# Patient Record
Sex: FEMALE | Race: WHITE | NOT HISPANIC OR LATINO | Employment: OTHER | URBAN - NONMETROPOLITAN AREA
[De-identification: names, ages, dates, MRNs, and addresses within clinical notes are randomized per-mention and may not be internally consistent; named-entity substitution may affect disease eponyms.]

---

## 2017-10-17 ENCOUNTER — TRANSCRIBE ORDERS (OUTPATIENT)
Dept: ADMINISTRATIVE | Facility: HOSPITAL | Age: 77
End: 2017-10-17

## 2017-10-17 DIAGNOSIS — R41.0 CONFUSION: Primary | ICD-10-CM

## 2017-10-20 ENCOUNTER — HOSPITAL ENCOUNTER (OUTPATIENT)
Dept: MRI IMAGING | Facility: HOSPITAL | Age: 77
Discharge: HOME | End: 2017-10-20
Attending: INTERNAL MEDICINE | Admitting: INTERNAL MEDICINE

## 2017-10-20 DIAGNOSIS — R41.0 CONFUSION: ICD-10-CM

## 2017-10-20 PROCEDURE — 70551 MRI BRAIN STEM W/O DYE: CPT

## 2017-10-20 PROCEDURE — 70551 MRI BRAIN STEM W/O DYE: CPT | Performed by: RADIOLOGY

## 2020-01-31 ENCOUNTER — TRANSCRIBE ORDERS (OUTPATIENT)
Dept: ADMINISTRATIVE | Facility: HOSPITAL | Age: 80
End: 2020-01-31

## 2020-01-31 DIAGNOSIS — H53.9 VISION DISORDER: ICD-10-CM

## 2020-01-31 DIAGNOSIS — Z86.73 PERSONAL HISTORY OF TRANSIENT CEREBRAL ISCHEMIA: Primary | ICD-10-CM

## 2020-01-31 DIAGNOSIS — R55 SYNCOPE AND COLLAPSE: ICD-10-CM

## 2020-01-31 DIAGNOSIS — R20.2 PARESTHESIA: ICD-10-CM

## 2020-01-31 DIAGNOSIS — Z85.841 PERSONAL HISTORY OF BRAIN CANCER: ICD-10-CM

## 2020-01-31 DIAGNOSIS — R55 VASOVAGAL SYNCOPE: ICD-10-CM

## 2020-01-31 DIAGNOSIS — Z85.841 PERSONAL HISTORY OF MALIGNANT NEOPLASM OF BRAIN: ICD-10-CM

## 2020-01-31 DIAGNOSIS — H53.9 UNSPECIFIED VISUAL DISTURBANCE: ICD-10-CM

## 2020-01-31 DIAGNOSIS — Z86.73 PERSONAL HISTORY OF TIA (TRANSIENT ISCHEMIC ATTACK): Primary | ICD-10-CM

## 2020-02-03 ENCOUNTER — HOSPITAL ENCOUNTER (OUTPATIENT)
Dept: CT IMAGING | Facility: HOSPITAL | Age: 80
Discharge: HOME | End: 2020-02-03
Admitting: NURSE PRACTITIONER

## 2020-02-03 ENCOUNTER — APPOINTMENT (OUTPATIENT)
Dept: CARDIOLOGY | Facility: HOSPITAL | Age: 80
End: 2020-02-03

## 2020-02-03 ENCOUNTER — APPOINTMENT (OUTPATIENT)
Dept: CT IMAGING | Facility: HOSPITAL | Age: 80
End: 2020-02-03

## 2020-02-03 DIAGNOSIS — H53.9 VISION DISORDER: ICD-10-CM

## 2020-02-03 DIAGNOSIS — R20.2 PARESTHESIA: ICD-10-CM

## 2020-02-03 DIAGNOSIS — Z86.73 PERSONAL HISTORY OF TIA (TRANSIENT ISCHEMIC ATTACK): ICD-10-CM

## 2020-02-03 DIAGNOSIS — R55 VASOVAGAL SYNCOPE: ICD-10-CM

## 2020-02-03 DIAGNOSIS — Z85.841 PERSONAL HISTORY OF BRAIN CANCER: ICD-10-CM

## 2020-02-03 PROCEDURE — 70450 CT HEAD/BRAIN W/O DYE: CPT

## 2020-02-03 PROCEDURE — 70450 CT HEAD/BRAIN W/O DYE: CPT | Performed by: RADIOLOGY

## 2020-07-12 ENCOUNTER — HOSPITAL ENCOUNTER (OUTPATIENT)
Facility: HOSPITAL | Age: 80
Setting detail: OBSERVATION
Discharge: HOME | End: 2020-07-14
Attending: INTERNAL MEDICINE | Admitting: INTERNAL MEDICINE

## 2020-07-12 DIAGNOSIS — R41.841 COGNITIVE COMMUNICATION DEFICIT: ICD-10-CM

## 2020-07-12 DIAGNOSIS — G45.9 TIA (TRANSIENT ISCHEMIC ATTACK): Primary | ICD-10-CM

## 2020-07-12 DIAGNOSIS — Z00.6 EXAMINATION FOR NORMAL COMPARISON FOR CLINICAL RESEARCH: ICD-10-CM

## 2020-07-12 PROBLEM — Z85.841 HISTORY OF BRAIN CANCER: Status: ACTIVE | Noted: 2020-07-12

## 2020-07-12 PROBLEM — Z86.73 HISTORY OF CVA (CEREBROVASCULAR ACCIDENT): Status: ACTIVE | Noted: 2020-07-12

## 2020-07-12 PROCEDURE — 82962 GLUCOSE BLOOD TEST: CPT

## 2020-07-12 PROCEDURE — 99203 OFFICE O/P NEW LOW 30 MIN: CPT | Performed by: NURSE PRACTITIONER

## 2020-07-12 PROCEDURE — G0378 HOSPITAL OBSERVATION PER HR: HCPCS

## 2020-07-12 PROCEDURE — 99220 PR INITIAL OBSERVATION CARE/DAY 70 MINUTES: CPT | Performed by: INTERNAL MEDICINE

## 2020-07-12 RX ORDER — SODIUM CHLORIDE 0.9 % (FLUSH) 0.9 %
10 SYRINGE (ML) INJECTION AS NEEDED
Status: DISCONTINUED | OUTPATIENT
Start: 2020-07-12 | End: 2020-07-14 | Stop reason: HOSPADM

## 2020-07-12 RX ORDER — ACETAMINOPHEN 650 MG/1
650 SUPPOSITORY RECTAL EVERY 4 HOURS PRN
Status: DISCONTINUED | OUTPATIENT
Start: 2020-07-12 | End: 2020-07-14 | Stop reason: HOSPADM

## 2020-07-12 RX ORDER — MONTELUKAST SODIUM 10 MG/1
10 TABLET ORAL NIGHTLY
Status: DISCONTINUED | OUTPATIENT
Start: 2020-07-13 | End: 2020-07-14 | Stop reason: HOSPADM

## 2020-07-12 RX ORDER — PANTOPRAZOLE SODIUM 40 MG/1
40 TABLET, DELAYED RELEASE ORAL
Status: DISCONTINUED | OUTPATIENT
Start: 2020-07-13 | End: 2020-07-14 | Stop reason: HOSPADM

## 2020-07-12 RX ORDER — ASPIRIN 81 MG/1
81 TABLET, CHEWABLE ORAL DAILY
Status: DISCONTINUED | OUTPATIENT
Start: 2020-07-13 | End: 2020-07-14 | Stop reason: HOSPADM

## 2020-07-12 RX ORDER — ACETAMINOPHEN 160 MG/5ML
650 SOLUTION ORAL EVERY 4 HOURS PRN
Status: DISCONTINUED | OUTPATIENT
Start: 2020-07-12 | End: 2020-07-14 | Stop reason: HOSPADM

## 2020-07-12 RX ORDER — ASPIRIN 300 MG/1
300 SUPPOSITORY RECTAL DAILY
Status: DISCONTINUED | OUTPATIENT
Start: 2020-07-13 | End: 2020-07-14 | Stop reason: HOSPADM

## 2020-07-12 RX ORDER — SODIUM CHLORIDE 0.9 % (FLUSH) 0.9 %
10 SYRINGE (ML) INJECTION EVERY 12 HOURS SCHEDULED
Status: DISCONTINUED | OUTPATIENT
Start: 2020-07-12 | End: 2020-07-14 | Stop reason: HOSPADM

## 2020-07-12 RX ORDER — CETIRIZINE HYDROCHLORIDE 10 MG/1
5 TABLET ORAL DAILY
Status: DISCONTINUED | OUTPATIENT
Start: 2020-07-13 | End: 2020-07-14 | Stop reason: HOSPADM

## 2020-07-12 RX ORDER — ACETAMINOPHEN 325 MG/1
650 TABLET ORAL EVERY 4 HOURS PRN
Status: DISCONTINUED | OUTPATIENT
Start: 2020-07-12 | End: 2020-07-14 | Stop reason: HOSPADM

## 2020-07-12 RX ORDER — DEXTROSE MONOHYDRATE 25 G/50ML
25 INJECTION, SOLUTION INTRAVENOUS
Status: DISCONTINUED | OUTPATIENT
Start: 2020-07-12 | End: 2020-07-14 | Stop reason: HOSPADM

## 2020-07-12 RX ORDER — DONEPEZIL HYDROCHLORIDE 10 MG/1
10 TABLET, FILM COATED ORAL NIGHTLY
Status: DISCONTINUED | OUTPATIENT
Start: 2020-07-13 | End: 2020-07-14 | Stop reason: HOSPADM

## 2020-07-12 RX ORDER — CLOPIDOGREL BISULFATE 75 MG/1
75 TABLET ORAL DAILY
Status: DISCONTINUED | OUTPATIENT
Start: 2020-07-13 | End: 2020-07-14 | Stop reason: HOSPADM

## 2020-07-12 RX ORDER — ATORVASTATIN CALCIUM 40 MG/1
80 TABLET, FILM COATED ORAL NIGHTLY
Status: DISCONTINUED | OUTPATIENT
Start: 2020-07-12 | End: 2020-07-13

## 2020-07-12 RX ORDER — GABAPENTIN 300 MG/1
600 CAPSULE ORAL EVERY 12 HOURS SCHEDULED
Status: DISCONTINUED | OUTPATIENT
Start: 2020-07-13 | End: 2020-07-14 | Stop reason: HOSPADM

## 2020-07-12 RX ORDER — NICOTINE POLACRILEX 4 MG
15 LOZENGE BUCCAL
Status: DISCONTINUED | OUTPATIENT
Start: 2020-07-12 | End: 2020-07-14 | Stop reason: HOSPADM

## 2020-07-12 RX ORDER — SODIUM CHLORIDE 0.9 % (FLUSH) 0.9 %
10 SYRINGE (ML) INJECTION EVERY 12 HOURS SCHEDULED
Status: DISCONTINUED | OUTPATIENT
Start: 2020-07-13 | End: 2020-07-14 | Stop reason: HOSPADM

## 2020-07-13 ENCOUNTER — APPOINTMENT (OUTPATIENT)
Dept: MRI IMAGING | Facility: HOSPITAL | Age: 80
End: 2020-07-13

## 2020-07-13 ENCOUNTER — APPOINTMENT (OUTPATIENT)
Dept: CARDIOLOGY | Facility: HOSPITAL | Age: 80
End: 2020-07-13

## 2020-07-13 ENCOUNTER — APPOINTMENT (OUTPATIENT)
Dept: OTHER | Facility: HOSPITAL | Age: 80
End: 2020-07-13

## 2020-07-13 ENCOUNTER — APPOINTMENT (OUTPATIENT)
Dept: CT IMAGING | Facility: HOSPITAL | Age: 80
End: 2020-07-13

## 2020-07-13 PROBLEM — I20.9 ANGINA PECTORIS (HCC): Status: ACTIVE | Noted: 2020-07-13

## 2020-07-13 PROBLEM — E11.9 TYPE 2 DIABETES MELLITUS: Status: ACTIVE | Noted: 2020-07-13

## 2020-07-13 PROBLEM — R55 SYNCOPE: Status: ACTIVE | Noted: 2020-07-13

## 2020-07-13 LAB
ANION GAP SERPL CALCULATED.3IONS-SCNC: 15 MMOL/L (ref 5–15)
BASOPHILS # BLD AUTO: 0.06 10*3/MM3 (ref 0–0.2)
BASOPHILS NFR BLD AUTO: 0.8 % (ref 0–1.5)
BILIRUB UR QL STRIP: NEGATIVE
BUN SERPL-MCNC: 17 MG/DL (ref 8–23)
BUN/CREAT SERPL: 22.1 (ref 7–25)
CALCIUM SPEC-SCNC: 9.1 MG/DL (ref 8.6–10.5)
CHLORIDE SERPL-SCNC: 104 MMOL/L (ref 98–107)
CHOLEST SERPL-MCNC: 93 MG/DL (ref 0–200)
CLARITY UR: CLEAR
CO2 SERPL-SCNC: 24 MMOL/L (ref 22–29)
COLOR UR: YELLOW
CREAT SERPL-MCNC: 0.77 MG/DL (ref 0.57–1)
DEPRECATED RDW RBC AUTO: 45.8 FL (ref 37–54)
EOSINOPHIL # BLD AUTO: 0.5 10*3/MM3 (ref 0–0.4)
EOSINOPHIL NFR BLD AUTO: 6.7 % (ref 0.3–6.2)
ERYTHROCYTE [DISTWIDTH] IN BLOOD BY AUTOMATED COUNT: 13 % (ref 12.3–15.4)
GFR SERPL CREATININE-BSD FRML MDRD: 72 ML/MIN/1.73
GLUCOSE BLDC GLUCOMTR-MCNC: 112 MG/DL (ref 70–130)
GLUCOSE BLDC GLUCOMTR-MCNC: 136 MG/DL (ref 70–130)
GLUCOSE BLDC GLUCOMTR-MCNC: 168 MG/DL (ref 70–130)
GLUCOSE BLDC GLUCOMTR-MCNC: 175 MG/DL (ref 70–130)
GLUCOSE BLDC GLUCOMTR-MCNC: 179 MG/DL (ref 70–130)
GLUCOSE SERPL-MCNC: 183 MG/DL (ref 65–99)
GLUCOSE UR STRIP-MCNC: ABNORMAL MG/DL
HBA1C MFR BLD: 7.2 % (ref 4.8–5.6)
HCT VFR BLD AUTO: 37.9 % (ref 34–46.6)
HDLC SERPL-MCNC: 50 MG/DL (ref 40–60)
HGB BLD-MCNC: 12.3 G/DL (ref 12–15.9)
HGB UR QL STRIP.AUTO: NEGATIVE
HOLD SPECIMEN: NORMAL
HOLD SPECIMEN: NORMAL
IMM GRANULOCYTES # BLD AUTO: 0.03 10*3/MM3 (ref 0–0.05)
IMM GRANULOCYTES NFR BLD AUTO: 0.4 % (ref 0–0.5)
KETONES UR QL STRIP: NEGATIVE
LDLC SERPL CALC-MCNC: 12 MG/DL (ref 0–100)
LDLC/HDLC SERPL: 0.25 {RATIO}
LEUKOCYTE ESTERASE UR QL STRIP.AUTO: NEGATIVE
LYMPHOCYTES # BLD AUTO: 2.22 10*3/MM3 (ref 0.7–3.1)
LYMPHOCYTES NFR BLD AUTO: 29.7 % (ref 19.6–45.3)
MCH RBC QN AUTO: 30.8 PG (ref 26.6–33)
MCHC RBC AUTO-ENTMCNC: 32.5 G/DL (ref 31.5–35.7)
MCV RBC AUTO: 95 FL (ref 79–97)
MONOCYTES # BLD AUTO: 0.59 10*3/MM3 (ref 0.1–0.9)
MONOCYTES NFR BLD AUTO: 7.9 % (ref 5–12)
NEUTROPHILS NFR BLD AUTO: 4.08 10*3/MM3 (ref 1.7–7)
NEUTROPHILS NFR BLD AUTO: 54.5 % (ref 42.7–76)
NITRITE UR QL STRIP: NEGATIVE
NRBC BLD AUTO-RTO: 0 /100 WBC (ref 0–0.2)
PH UR STRIP.AUTO: 7 [PH] (ref 5–8)
PLATELET # BLD AUTO: 196 10*3/MM3 (ref 140–450)
PMV BLD AUTO: 11.1 FL (ref 6–12)
POTASSIUM SERPL-SCNC: 3.5 MMOL/L (ref 3.5–5.2)
PROT UR QL STRIP: NEGATIVE
RBC # BLD AUTO: 3.99 10*6/MM3 (ref 3.77–5.28)
SODIUM SERPL-SCNC: 143 MMOL/L (ref 136–145)
SP GR UR STRIP: 1.04 (ref 1–1.03)
TRIGL SERPL-MCNC: 153 MG/DL (ref 0–150)
TROPONIN T SERPL-MCNC: <0.01 NG/ML (ref 0–0.03)
UROBILINOGEN UR QL STRIP: ABNORMAL
VLDLC SERPL-MCNC: 30.6 MG/DL
WBC # BLD AUTO: 7.48 10*3/MM3 (ref 3.4–10.8)
WHOLE BLOOD HOLD SPECIMEN: NORMAL
WHOLE BLOOD HOLD SPECIMEN: NORMAL

## 2020-07-13 PROCEDURE — G0378 HOSPITAL OBSERVATION PER HR: HCPCS

## 2020-07-13 PROCEDURE — 0042T HC CT CEREBRAL PERFUSION W/WO CONTRAST: CPT

## 2020-07-13 PROCEDURE — 93306 TTE W/DOPPLER COMPLETE: CPT | Performed by: INTERNAL MEDICINE

## 2020-07-13 PROCEDURE — A9577 INJ MULTIHANCE: HCPCS | Performed by: INTERNAL MEDICINE

## 2020-07-13 PROCEDURE — 97530 THERAPEUTIC ACTIVITIES: CPT

## 2020-07-13 PROCEDURE — 0 IOPAMIDOL PER 1 ML: Performed by: INTERNAL MEDICINE

## 2020-07-13 PROCEDURE — C9803 HOPD COVID-19 SPEC COLLECT: HCPCS | Performed by: INTERNAL MEDICINE

## 2020-07-13 PROCEDURE — 93005 ELECTROCARDIOGRAM TRACING: CPT | Performed by: PHYSICIAN ASSISTANT

## 2020-07-13 PROCEDURE — 97166 OT EVAL MOD COMPLEX 45 MIN: CPT

## 2020-07-13 PROCEDURE — U0002 COVID-19 LAB TEST NON-CDC: HCPCS | Performed by: INTERNAL MEDICINE

## 2020-07-13 PROCEDURE — 83036 HEMOGLOBIN GLYCOSYLATED A1C: CPT | Performed by: NURSE PRACTITIONER

## 2020-07-13 PROCEDURE — 85025 COMPLETE CBC W/AUTO DIFF WBC: CPT | Performed by: NURSE PRACTITIONER

## 2020-07-13 PROCEDURE — 25010000002 HEPARIN (PORCINE) PER 1000 UNITS: Performed by: INTERNAL MEDICINE

## 2020-07-13 PROCEDURE — 84484 ASSAY OF TROPONIN QUANT: CPT | Performed by: INTERNAL MEDICINE

## 2020-07-13 PROCEDURE — 82962 GLUCOSE BLOOD TEST: CPT

## 2020-07-13 PROCEDURE — 0 GADOBENATE DIMEGLUMINE 529 MG/ML SOLUTION: Performed by: INTERNAL MEDICINE

## 2020-07-13 PROCEDURE — G0108 DIAB MANAGE TRN  PER INDIV: HCPCS

## 2020-07-13 PROCEDURE — U0004 COV-19 TEST NON-CDC HGH THRU: HCPCS | Performed by: INTERNAL MEDICINE

## 2020-07-13 PROCEDURE — 97162 PT EVAL MOD COMPLEX 30 MIN: CPT

## 2020-07-13 PROCEDURE — 70553 MRI BRAIN STEM W/O & W/DYE: CPT

## 2020-07-13 PROCEDURE — 99213 OFFICE O/P EST LOW 20 MIN: CPT | Performed by: PSYCHIATRY & NEUROLOGY

## 2020-07-13 PROCEDURE — 93306 TTE W/DOPPLER COMPLETE: CPT

## 2020-07-13 PROCEDURE — 93010 ELECTROCARDIOGRAM REPORT: CPT | Performed by: INTERNAL MEDICINE

## 2020-07-13 PROCEDURE — 96372 THER/PROPH/DIAG INJ SC/IM: CPT

## 2020-07-13 PROCEDURE — 70498 CT ANGIOGRAPHY NECK: CPT

## 2020-07-13 PROCEDURE — 81003 URINALYSIS AUTO W/O SCOPE: CPT | Performed by: INTERNAL MEDICINE

## 2020-07-13 PROCEDURE — 80048 BASIC METABOLIC PNL TOTAL CA: CPT | Performed by: NURSE PRACTITIONER

## 2020-07-13 PROCEDURE — 70496 CT ANGIOGRAPHY HEAD: CPT

## 2020-07-13 PROCEDURE — 80061 LIPID PANEL: CPT | Performed by: NURSE PRACTITIONER

## 2020-07-13 PROCEDURE — 99225 PR SBSQ OBSERVATION CARE/DAY 25 MINUTES: CPT | Performed by: INTERNAL MEDICINE

## 2020-07-13 PROCEDURE — 63710000001 INSULIN LISPRO (HUMAN) PER 5 UNITS: Performed by: NURSE PRACTITIONER

## 2020-07-13 RX ORDER — ASPIRIN 325 MG
1 TABLET ORAL DAILY
COMMUNITY
Start: 2012-08-16 | End: 2020-07-14 | Stop reason: HOSPADM

## 2020-07-13 RX ORDER — ROSUVASTATIN CALCIUM 20 MG/1
20 TABLET, COATED ORAL NIGHTLY
Status: DISCONTINUED | OUTPATIENT
Start: 2020-07-13 | End: 2020-07-14 | Stop reason: HOSPADM

## 2020-07-13 RX ORDER — POTASSIUM CHLORIDE 1.5 G/1.77G
40 POWDER, FOR SOLUTION ORAL AS NEEDED
Status: DISCONTINUED | OUTPATIENT
Start: 2020-07-13 | End: 2020-07-14 | Stop reason: HOSPADM

## 2020-07-13 RX ORDER — DIVALPROEX SODIUM 500 MG/1
500 TABLET, DELAYED RELEASE ORAL EVERY 12 HOURS SCHEDULED
Status: DISCONTINUED | OUTPATIENT
Start: 2020-07-13 | End: 2020-07-14 | Stop reason: HOSPADM

## 2020-07-13 RX ORDER — LORAZEPAM 0.5 MG/1
0.5 TABLET ORAL EVERY 8 HOURS PRN
COMMUNITY

## 2020-07-13 RX ORDER — ENALAPRIL MALEATE 5 MG/1
10 TABLET ORAL
Status: DISCONTINUED | OUTPATIENT
Start: 2020-07-13 | End: 2020-07-14 | Stop reason: HOSPADM

## 2020-07-13 RX ORDER — CARVEDILOL 3.12 MG/1
3.12 TABLET ORAL 2 TIMES DAILY WITH MEALS
Status: DISCONTINUED | OUTPATIENT
Start: 2020-07-13 | End: 2020-07-14 | Stop reason: HOSPADM

## 2020-07-13 RX ORDER — ISOSORBIDE MONONITRATE 60 MG/1
60 TABLET, EXTENDED RELEASE ORAL DAILY
Status: DISCONTINUED | OUTPATIENT
Start: 2020-07-13 | End: 2020-07-14 | Stop reason: HOSPADM

## 2020-07-13 RX ORDER — HEPARIN SODIUM 5000 [USP'U]/ML
5000 INJECTION, SOLUTION INTRAVENOUS; SUBCUTANEOUS EVERY 8 HOURS SCHEDULED
Status: DISCONTINUED | OUTPATIENT
Start: 2020-07-13 | End: 2020-07-14 | Stop reason: HOSPADM

## 2020-07-13 RX ORDER — POTASSIUM CHLORIDE 750 MG/1
40 CAPSULE, EXTENDED RELEASE ORAL AS NEEDED
Status: DISCONTINUED | OUTPATIENT
Start: 2020-07-13 | End: 2020-07-14 | Stop reason: HOSPADM

## 2020-07-13 RX ADMIN — ENALAPRIL MALEATE 10 MG: 5 TABLET ORAL at 14:14

## 2020-07-13 RX ADMIN — POTASSIUM CHLORIDE 40 MEQ: 1.5 POWDER, FOR SOLUTION ORAL at 11:26

## 2020-07-13 RX ADMIN — SODIUM CHLORIDE, PRESERVATIVE FREE 10 ML: 5 INJECTION INTRAVENOUS at 09:35

## 2020-07-13 RX ADMIN — HEPARIN SODIUM 5000 UNITS: 5000 INJECTION INTRAVENOUS; SUBCUTANEOUS at 21:02

## 2020-07-13 RX ADMIN — DONEPEZIL HYDROCHLORIDE 10 MG: 5 TABLET ORAL at 02:42

## 2020-07-13 RX ADMIN — GABAPENTIN 600 MG: 300 CAPSULE ORAL at 02:42

## 2020-07-13 RX ADMIN — MONTELUKAST SODIUM 10 MG: 10 TABLET, COATED ORAL at 21:00

## 2020-07-13 RX ADMIN — DONEPEZIL HYDROCHLORIDE 10 MG: 5 TABLET ORAL at 20:50

## 2020-07-13 RX ADMIN — IOPAMIDOL 115 ML: 755 INJECTION, SOLUTION INTRAVENOUS at 00:10

## 2020-07-13 RX ADMIN — ACETAMINOPHEN 650 MG: 325 TABLET, FILM COATED ORAL at 02:46

## 2020-07-13 RX ADMIN — DIVALPROEX SODIUM 500 MG: 500 TABLET, DELAYED RELEASE ORAL at 17:14

## 2020-07-13 RX ADMIN — SODIUM CHLORIDE, PRESERVATIVE FREE 10 ML: 5 INJECTION INTRAVENOUS at 21:03

## 2020-07-13 RX ADMIN — GADOBENATE DIMEGLUMINE 15 ML: 529 INJECTION, SOLUTION INTRAVENOUS at 01:45

## 2020-07-13 RX ADMIN — GABAPENTIN 600 MG: 300 CAPSULE ORAL at 17:14

## 2020-07-13 RX ADMIN — INSULIN LISPRO 2 UNITS: 100 INJECTION, SOLUTION INTRAVENOUS; SUBCUTANEOUS at 12:15

## 2020-07-13 RX ADMIN — CLOPIDOGREL BISULFATE 75 MG: 75 TABLET ORAL at 09:35

## 2020-07-13 RX ADMIN — ATORVASTATIN CALCIUM 80 MG: 40 TABLET, FILM COATED ORAL at 02:41

## 2020-07-13 RX ADMIN — CETIRIZINE HYDROCHLORIDE 5 MG: 10 TABLET, FILM COATED ORAL at 09:35

## 2020-07-13 RX ADMIN — CARVEDILOL 3.12 MG: 3.12 TABLET, FILM COATED ORAL at 17:14

## 2020-07-13 RX ADMIN — DIVALPROEX SODIUM 500 MG: 500 TABLET, DELAYED RELEASE ORAL at 20:50

## 2020-07-13 RX ADMIN — ASPIRIN 81 MG: 81 TABLET, CHEWABLE ORAL at 09:35

## 2020-07-13 RX ADMIN — POTASSIUM CHLORIDE 40 MEQ: 1.5 POWDER, FOR SOLUTION ORAL at 20:49

## 2020-07-13 RX ADMIN — ROSUVASTATIN CALCIUM 20 MG: 20 TABLET, COATED ORAL at 20:50

## 2020-07-13 RX ADMIN — ISOSORBIDE MONONITRATE 60 MG: 60 TABLET, EXTENDED RELEASE ORAL at 17:14

## 2020-07-13 RX ADMIN — MONTELUKAST SODIUM 10 MG: 10 TABLET, COATED ORAL at 02:41

## 2020-07-13 RX ADMIN — SODIUM CHLORIDE, PRESERVATIVE FREE 10 ML: 5 INJECTION INTRAVENOUS at 20:50

## 2020-07-13 NOTE — THERAPY EVALUATION
"Patient Name: Samantha Napoles  : 1940    MRN: [id number]                              Today's Date: 2020       Admit Date: 2020    Visit Dx: No diagnosis found.  Patient Active Problem List   Diagnosis   • Coronary artery disease   • HTN (hypertension)   • Hypertensive heart disease   • Dyslipidemia   • Degenerative arthritis   • GERD (gastroesophageal reflux disease)   • TIA (transient ischemic attack)   • History of CVA (cerebrovascular accident)   • History of brain cancer   • Type 2 diabetes mellitus (CMS/HCC)   • Angina pectoris (CMS/HCC)   • Questionable Syncope     No past medical history on file.  Past Surgical History:   Procedure Laterality Date   • BRAIN SURGERY      \"brain tumor surgery\"; incomplete database   • CHOLECYSTECTOMY       General Information     Row Name 20 0932          PT Evaluation Time/Intention    Document Type  evaluation  (Pended)   -     Mode of Treatment  physical therapy  (Pended)   -     Row Name 2032          General Information    Patient Profile Reviewed?  yes  (Pended)   -     Prior Level of Function  independent:;gait;all household mobility;bed mobility;ADL's;community mobility;shopping;home management;cooking  (Pended)  Limited with walking distance due to fatigue  -     Existing Precautions/Restrictions  fall  (Pended)  Fall experienced in 2020  -     Barriers to Rehab  medically complex;impaired sensation  (Pended)   -     Row Name 20 0932          Relationship/Environment    Lives With  alone  (Pended)  Son lives close and checks on her daily  -     Row Name 20 0932          Resource/Environmental Concerns    Current Living Arrangements  home/apartment/condo  (Pended)   -     Row Name 20 0932          Home Main Entrance    Number of Stairs, Main Entrance  none  (Pended)   -     Row Name 20 0932          Stairs Within Home, Primary    Number of Stairs, Within Home, Primary  none  (Pended)   " -[facility] Name 07/13/20 0932          Cognitive Assessment/Intervention- PT/OT    Orientation Status (Cognition)  oriented x 3  (Pended)   -     Cognitive Assessment/Intervention Comment  Alert and follows commands  (Pended)   -[facility] Name 07/13/20 0932          Safety Issues, Functional Mobility    Safety Issues Affecting Function (Mobility)  insight into deficits/self awareness;sequencing abilities  (Pended)   -     Impairments Affecting Function (Mobility)  balance;endurance/activity tolerance;range of motion (ROM);pain;sensation/sensory awareness;strength;postural/trunk control  (Pended)   -     Comment, Safety Issues/Impairments (Mobility)  Pt has strong desire to be independent. Impulsive behaviors and jerky whole-body movements noted throughout session.  (Pended)   -       User Key  (r) = Recorded By, (t) = Taken By, (c) = Cosigned By    Initials Name Provider Type    YNES Sherri Berry, PT Student PT Student        Mobility     [facility] Name 07/13/20 1018          Bed Mobility Assessment/Treatment    Bed Mobility Assessment/Treatment  rolling left;supine-sit  (Pended)   -     Rolling Left [place] (Bed Mobility)  minimum assist (75% patient effort)  (Pended)   -     Supine-Sit [place] (Bed Mobility)  minimum assist (75% patient effort);moderate assist (50% patient effort)  (Pended)  Min<>ModAx1 -- observed  -     Assistive Device (Bed Mobility)  bed rails;head of bed elevated  (Pended)   -     Comment (Bed Mobility)  Observed mobility performed with RN  (Pended)   -[facility] Name 07/13/20 1018          Sit-Stand Transfer    Sit-Stand [place] (Transfers)  contact guard;verbal cues;stand by assist;minimum assist (75% patient effort)  (Pended)  VC for hand placement and encouraged to use walker. 4 STS performed in totality. Pt performed 2 unplanned STS with SBA due to cramping sensation in R LE.  -G     Assistive Device (Sit-Stand Transfers)  walker, front-wheeled  (Pended)    -JG     Row Name 07/13/20 1018          Gait/Stairs Assessment/Training    Gait/Stairs Assessment/Training  gait/ambulation assistive device  (Pended)   -JG     [place] Level (Gait)  verbal cues;minimum assist (75% patient effort);2 person assist  (Pended)  MinAx2 for postural stability. Posterior postural sway/jerks noted intermittently during ambulatuion.  -JG     Assistive Device (Gait)  walker, front-wheeled  (Pended)   -JG     Distance in Feet (Gait)  12 feet, 13 feet  (Pended)   -JG     Pattern (Gait)  step-through  (Pended)   -JG     Deviations/Abnormal Patterns (Gait)  bilateral deviations;antalgic;base of support, narrow;michael decreased;stride length decreased;gait speed decreased  (Pended)   -JG     Bilateral Gait Deviations  forward flexed posture;heel strike decreased  (Pended)   -JG     Left Sided Gait Deviations  weight shift ability decreased  (Pended)   -JG     [place] Level (Stairs)  not tested  (Pended)   -JG     Comment (Gait/Stairs)  Pt ambulated with a rolling walker and MinAx2 for postural stability due to intermittent posterior postural sway/jerks experienced. Pt utilized a hip strategy to attempt to regain balance, but was unable to achieve this independently. Decreased weight shift to L LE due to pain in plantar surface of L foot.  (Pended)   -JG       User Key  (r) = Recorded By, (t) = Taken By, (c) = Cosigned By    Initials Name Provider Type    Sherri Bullock, PT Student PT Student        Obj/Interventions     Row Name 07/13/20 0934          General ROM    GENERAL ROM COMMENTS  B LE: WFL  (Pended)   -     Row Name 07/13/20 0934          MMT (Manual Muscle Testing)    General MMT Comments  B LE: Hip flexors, quads, hamstrings, DF and PF 3-/5 due to pain/weakness.   (Pended)   -     Row Name 07/13/20 0934          Therapeutic Exercise    Lower Extremity Range of Motion (Therapeutic Exercise)  knee flexion/extension, bilateral;ankle dorsiflexion/plantar flexion,  bilateral  (Pended)   -JG     Exercise Type (Therapeutic Exercise)  PROM (passive range of motion);AROM (active range of motion)  (Pended)   -JG     Position (Therapeutic Exercise)  seated  (Pended)   -JG     Sets/Reps (Therapeutic Exercise)  1 set, 10 reps  (Pended)   -JG     Expected Outcome (Therapeutic Exercise)  facilitate normal movement patterns;improve functional stability;improve neuromuscular control;improve motor control  (Pended)   -JG     Comment (Therapeutic Exercise)  VC for technique  (Pended)   -JG     Row Name 07/13/20 0934          Static Sitting Balance    Level of [place] (Unsupported Sitting, Static Balance)  standby assist  (Pended)  VC to place feet on ground for safety  -JG     Sitting Position (Unsupported Sitting, Static Balance)  sitting on edge of bed  (Pended)   -JG     Time Able to Maintain Position (Unsupported Sitting, Static Balance)  1 to 2 minutes  (Pended)   -JG     Comment (Unsupported Sitting, Static Balance)  No loss of balance observed  (Pended)   -JG     Row Name 07/13/20 0934          Static Standing Balance    Level of [place] (Supported Standing, Static Balance)  contact guard assist;minimal assist, 75% patient effort  (Pended)   -JG     Time Able to Maintain Position (Supported Standing, Static Balance)  2 to 3 minutes  (Pended)   -JG     Assistive Device Utilized (Supported Standing, Static Balance)  walker, rolling  (Pended)   -JG     Comment (Supported Standing, Static Balance)  While standing at sink, pt utilizes external support for her legs from the countertop for additonal support. Intermittent posterior postural sway/jerk noted with hip strategy attempted, but pt required minAx1 to regain balance.  (Pended)   -JG     Row Name 07/13/20 0934          Sensory Assessment/Intervention    Sensory General Assessment  --  (Pended)  Decreased light touch sensation throughout L LE.  -JG       User Key  (r) = Recorded By, (t) = Taken By, (c) = Cosigned By     Initials Name Provider Type    J Sherri Berry, PT Student PT Student        Goals/Plan     Row Name 07/13/20 1029          Bed Mobility Goal 1 (PT)    Activity/Assistive Device (Bed Mobility Goal 1, PT)  rolling to left;rolling to right;sit to supine/supine to sit  (Pended)   -JG     [place] Level/Cues Needed (Bed Mobility Goal 1, PT)  conditional independence  (Pended)   -JG     Time Frame (Bed Mobility Goal 1, PT)  long term goal (LTG);10 days  (Pended)   -JG     Row Name 07/13/20 1029          Transfer Goal 1 (PT)    Activity/Assistive Device (Transfer Goal 1, PT)  sit-to-stand/stand-to-sit;bed-to-chair/chair-to-bed;walker, rolling  (Pended)   -JG     [place] Level/Cues Needed (Transfer Goal 1, PT)  standby assist  (Pended)   -JG     Time Frame (Transfer Goal 1, PT)  long term goal (LTG);10 days  (Pended)   -JG     Row Name 07/13/20 1029          Gait Training Goal 1 (PT)    Activity/Assistive Device (Gait Training Goal 1, PT)  gait (walking locomotion);assistive device use;walker, rolling  (Pended)   -JG     [place] Level (Gait Training Goal 1, PT)  standby assist  (Pended)   -JG     Distance (Gait Goal 1, PT)  150 feet  (Pended)   -JG     Time Frame (Gait Training Goal 1, PT)  long term goal (LTG);10 days  (Pended)   -JG     Row Name 07/13/20 1029          Patient Education Goal (PT)    Activity (Patient Education Goal, PT)  Pt understands HEP  (Pended)   -JG     [place]/Cues/Accuracy (Memory Goal 2, PT)  demonstrates adequately;verbalizes understanding;independent  (Pended)   -JG     Time Frame (Patient Education Goal, PT)  long term goal (LTG);10 days  (Pended)   -JG       User Key  (r) = Recorded By, (t) = Taken By, (c) = Cosigned By    Initials Name Provider Type    J Sherri Berry, PT Student PT Student        Clinical Impression     Row Name 07/13/20 0935          Pain Assessment    Additional Documentation  Pain Scale: FACES Pre/Post-Treatment (Group)  (Pended)   -JG      Row Name 07/13/20 0935          Pain Scale: Numbers Pre/Post-Treatment    Pain Location - Side  Bilateral  (Pended)   -     Pain Location - Orientation  generalized  (Pended)   -J     Pain Location  other (see comments)  (Pended)  Legs  -JG     Pain Intervention(s)  Repositioned;Ambulation/increased activity  (Pended)   -     Row Name 07/13/20 0935          Pain Scale: FACES Pre/Post-Treatment    Pain: FACES Scale, Pretreatment  4-->hurts little more  (Pended)   -JG     Pain: FACES Scale, Post-Treatment  4-->hurts little more  (Pended)   -JG     Pre/Post Treatment Pain Comment  Pt verbalized pain at the plantar surface of her L foot. Assessment of this indicated signs of a dropped 1st metatarsal head with minimal fat pad. Caution with walking prolonged distances due to this.  (Pended)   -     Row Name 07/13/20 0935          Plan of Care Review    Plan of Care Reviewed With  patient  (Pended)   -     Outcome Summary  PT evaluation completed. Pt has residual L LE weakness, intermittent whole body posterior postural sway/jerks, and signs of a dropped 1st metatarsal head with minimal fat pad on her L LE. Pt demonstrates impulsive behaviors, so CGA<>MinAx2 was required for a STS. Pt ambulated 12' then 13' with a rolling walker and CGA<>MinAx2 due to postural instabilities. Pt would benefit from static and dynamic postural interventions. Discharge recommendation is IRF to enhance functional independence.  (Pended)   -     Row Name 07/13/20 0935          Physical Therapy Clinical Impression    Patient/Family Goals Statement (PT Clinical Impression)  To return home.  (Pended)   -     Criteria for Skilled Interventions Met (PT Clinical Impression)  yes;treatment indicated  (Pended)   -     Rehab Potential (PT Clinical Summary)  good, to achieve stated therapy goals  (Pended)   -     Predicted Duration of Therapy (PT)  10 days  (Pended)   -     Row Name 07/13/20 0935          Vital Signs    Post Systolic BP  Rehab  159  (Pended)   -JG     Post Treatment Diastolic BP  79  (Pended)   -JG     Posttreatment Heart Rate (beats/min)  72  (Pended)   -JG     Post SpO2 (%)  96  (Pended)   -JG     O2 Delivery Post Treatment  room air  (Pended)   -JG     Post Patient Position  Sitting  (Pended)   -JG     Row Name 07/13/20 0935          Positioning and Restraints    Pre-Treatment Position  in bed  (Pended)   -JG     Post Treatment Position  chair  (Pended)   -JG     In Chair  reclined;call light within reach;encouraged to call for assist;exit alarm on;with OT;legs elevated;waffle cushion  (Pended)   -JG       User Key  (r) = Recorded By, (t) = Taken By, (c) = Cosigned By    Initials Name Provider Type    Sherri Bullock, PT Student PT Student        Outcome Measures     Row Name 07/13/20 1037          How much help from another person do you currently need...    Turning from your back to your side while in flat bed without using bedrails?  3  (Pended)   -JG     Moving from lying on back to sitting on the side of a flat bed without bedrails?  3  (Pended)   -JG     Moving to and from a bed to a chair (including a wheelchair)?  3  (Pended)   -JG     Standing up from a chair using your arms (e.g., wheelchair, bedside chair)?  3  (Pended)   -JG     Climbing 3-5 steps with a railing?  2  (Pended)   -JG     To walk in hospital room?  3  (Pended)   -JG     AM-PAC 6 Clicks Score (PT)  17  (Pended)   -KW (r) JG (t)     Row Name 07/13/20 1037          Modified [place] Scale    Modified [place] Scale  4 - Moderately severe disability.  Unable to walk without assistance, and unable to attend to own bodily needs without assistance.  (Pended)   -JG     Row Name 07/13/20 [facility]          Functional Assessment    Outcome Measure Options  AM-PAC 6 Clicks Basic Mobility (PT);Modified [place]  (Pended)   -JG       User Key  (r) = Recorded By, (t) = Taken By, (c) = Cosigned By    Initials Name Provider Type    Ulysses Chavez, RN Registered Nurse    NIK  Sherri Berry, PT Student PT Student        Physical Therapy Education                 Title: PT OT SLP Therapies (Done)     Topic: Physical Therapy (Done)     Point: Mobility training (Done)     Description:   Instruct learner(s) on safety and technique for assisting patient out of bed, chair or wheelchair.  Instruct in the proper use of assistive devices, such as walker, crutches, cane or brace.              Patient Friendly Description:   It's important to get you on your feet again, but we need to do so in a way that is safe for you. Falling has serious consequences, and your personal safety is the most important thing of all.        When it's time to get out of bed, one of us or a family member will sit next to you on the bed to give you support.     If your doctor or nurse tells you to use a walker, crutches, a cane, or a brace, be sure you use it every time you get out of bed, even if you think you don't need it.    Learning Progress Summary           Patient Acceptance, E, VU by NIK at 7/13/2020 0918    Comment:  Pt educated about HEP, bed mobility techniques and plan of care.                   Point: Home exercise program (Done)     Description:   Instruct learner(s) on appropriate technique for monitoring, assisting and/or progressing patient with therapeutic exercises and activities.              Learning Progress Summary           Patient Acceptance, E, VU by NIK at 7/13/2020 0918    Comment:  Pt educated about HEP, bed mobility techniques and plan of care.                   Point: Body mechanics (Done)     Description:   Instruct learner(s) on proper positioning and spine alignment for patient and/or caregiver during mobility tasks and/or exercises.              Learning Progress Summary           Patient Acceptance, E, VU by NIK at 7/13/2020 0918    Comment:  Pt educated about HEP, bed mobility techniques and plan of care.                               User Key     Initials Effective Dates Name  Provider Type Discipline     05/14/20 -  Sherri Berry, PT Student PT Student PT              PT Recommendation and Plan  Planned Therapy Interventions (PT Eval): (P) balance training, bed mobility training, gait training, home exercise program, neuromuscular re-education, patient/family education, postural re-education, ROM (range of motion), stair training, strengthening, stretching, transfer training  Plan of Care Reviewed With: (P) patient  Outcome Summary: (P) PT evaluation completed. Pt has residual L LE weakness, intermittent whole body posterior postural sway/jerks, and signs of a dropped 1st metatarsal head with minimal fat pad on her L LE. Pt demonstrates impulsive behaviors, so CGA<>MinAx2 was required for a STS. Pt ambulated 12' then 13' with a rolling walker and CGA<>MinAx2 due to postural instabilities. Pt would benefit from static and dynamic postural interventions. Discharge recommendation is IRF to enhance functional independence.     Time Calculation:   PT Charges     Row Name 07/13/20 1038             Time Calculation    Start Time  0918  (Pended)   -      PT Received On  07/13/20  (Pended)   -      PT Goal Re-Cert Due Date  07/23/20  (Pended)   -         Time Calculation- PT    Total Timed Code Minutes- PT  10 minute(s)  (Pended)   -G         Timed Charges    41496 - PT Therapeutic Activity Minutes  10  (Pended)   -        User Key  (r) = Recorded By, (t) = Taken By, (c) = Cosigned By    Initials Name Provider Type     Sherri Berry, PT Student PT Student        Therapy Charges for Today     Code Description Service Date Service Provider Modifiers Qty    65916108717 HC PT EVAL MOD COMPLEXITY 4 7/13/2020 Sherri Berry, PT Student GP 1    [id number]  PT THERAPEUTIC ACT EA 15 MIN 7/13/2020 Sherri Berry, PT Student GP 1          PT G-Codes  Outcome Measure Options: (P) AM-PAC 6 Clicks Basic Mobility (PT), Modified [place]  AM-PAC 6 Clicks Score (PT): (P) 17  AM-PAC 6  Clicks Score (OT): 16  Modified [place]iley Scale: (P) 4 - Moderately severe disability.  Unable to walk without assistance, and unable to attend to own bodily needs without assistance.    Sherri Berry, PT Student  7/13/2020

## 2020-07-13 NOTE — SIGNIFICANT NOTE
07/13/20 1611   SLP Deferred Reason   SLP Deferred Reason Patient unavailable for evaluation  (Consult received for cognitive-communication eval per CVA pathway. Pt w/ echo/RN when attempted x2 this afternoon. Will f/u tomorrow.)

## 2020-07-13 NOTE — NURSING NOTE
ACC REVIEW REPORT: [facility]        PATIENT NAME: Samantha Napoles    PATIENT ID: [id number]      COVID-19 ACC SCREENING       DOES THE PATIENT HAVE A FEVER GREATER THAN OR EQUAL .4: no    IS THE PATIENT EXPERIENCING SHORTNESS OF BREATH: no    DOES THE PATIENT HAVE A COUGH: no    DOES THE PATIENT HAVE ANY OF THE FOLLOWING RISK FACTORS:    EXPOSURE TO SUSPECTED OR KNOWN COVID-19: no    RECENT TRAVEL HISTORY TO ENDEMIC AREA (DOMESTIC/LOCAL): no    IS THE PATIENT A HEALTHCARE WORKER: no    HAS THE PATIENT BEEN TESTED FOR COVID-19: no    DATE TESTED: n/a    LAB TESTING SENT TO: n/a      BED: S507    BED TYPE: tele    BED GIVEN TO: Nae Barba RN    TIME BED GIVEN: 2020    YOB: 1940    AGE: 80    GENDER: Female    PREVIOUS ADMIT TO [facility]:     PREVIOUS ADMISSION DATE:     PATIENT CLASS: inpatient    TODAY'S DATE: 7/12/2020    TRANSFER DATE: 7/12/2020    ETA: 2300    TRANSFERRING FACILITY: [facility] ED    TRANSFERRING FACILITY PHONE # : [phone]    TRANSFERRING MD: Isaiah Muñoz    ACCEPTING PROVIDER: NAVIGATOR    NEUROLOGY PHYSICIAN: Todd    DATE/TIME REQUEST RECEIVED: 7/12 @ 4542    [facility] RN: Ivet Boyd RN    REPORT FROM: Yuliana Barba RN    TIME REPORT TAKEN: 2004    DIAGNOSIS: TIA vs CVA    REASON FOR TRANSFER TO [facility]: stroke protocol    TRANSPORTATION: local ground    CLINICAL REASON FOR TRANSFER TO [facility]: higher level of care      CLINICAL INFORMATION    HEIGHT: 65 inches    WEIGHT: 162 lbs    ALLERGIES: Iodine solution, PCN, phenergan    SIDHU: no    INFECTIOUS DISEASE: no    ISOLATION: no    LAST VITAL SIGNS:  TIME: 2010  TEMP: 98.3  PULSE: 83  B/P: 162/68  RESP: 18    LAB INFORMATION: WBC - negative, BUN 23, Cr 0.69, Hgb 7.7, glucose at 1700 - 115, UA - negative    CULTURE INFORMATION: none    MEDS/IV FLUIDS: no IV placed  No medication given while in ED      CARDIAC SYSTEM:    CHEST PAIN: no    RATE:     SCALE:     RHYTHM: NSR with PVC's    Is patient taking  or has patient been given any drugs that could increase bleeding? yes  (Plavix, Brilinta, Effient, Eliquis, Xarelto, Warfarin, Integrilin, Angiomax)    DRUG: Plavix     DOSE/FREQUENCY: 75 mg daily    CARDIAC NOTES: took nitro at home for chest heaviness, trop negative, pain went away      RESPIRATORY SYSTEM:    LUNG SOUNDS:    CLEAR: y  CRACKLES: n  WHEEZES: n  RHONCHI: n  DIMINISHED: n    OXYGEN: no    O2 SAT: 95%    ADMINISTRATION ROUTE: room air    RADIOLOGY RESULTS: CXR - negative    RESPIRATORY STATUS: stable      CNS/MUSCULOSKELETAL      LUCIA COMA SCALE:    E: 4  M: 6  V: 5    LAST KNOWN WELL: 2-3 weeks, legs felt weaker today, no specific time able to be obtained.       NIHSS    Survey Item  0: Means Alert  1: Drowsy or Answer Correctly  2: Incorrect, Forced, Can't Resist Gravity  3: Complete or No Effort  4: No Movement  NT: Not Testable Acceptable As Noted Above      1A: Level of Consciousness: 0    1B: LOC Questions (month, age) : 0    1C: LOC Commands (open/close eyes, make a fist & let go): 0    2:  Best Gaze (eyes open-pt follows examiner's fingers or face): 0    3:  Visual (introduce visual stimulus/threat to pt's visual field quad. Cover 1 eye and hold up fingers in all 4 quadrants) : 0    4.  Facial Palsy (show teeth, raise eyebrows and squeeze eyes tightly shut): 0    5A: Motor Arm-Left (elevate extremity to 90 degrees and score drift/movement.  Count to 10 aloud and use fingers for visual cue): 0    5B:  Motor Arm-Right (elevate extremity to 90 degrees and score drift/movement.  Count to 10 aloud and use fingers for visual cue): 0    6A:  Motor Leg-Left (elevate extremity to 30 degrees and score drift/movement.  Count to 5 out loud and use fingers for visual cue): 0    6B:  Motor Leg-Right (elevate extremity to 30 degrees and score drift/movement.  Count to 5 out loud and use fingers for visual cue): 0    7:  Limb Ataxia- finger to nose, heel down shin: 0    8:  Sensory- pin prick to face, arms,  "trunk, and legs. Compare sharpness side to side: 0    9:  Best Language- name, items, describe picture, and read sentences.  Do not forget glasses if they normally wear them: 0    10: Dysarthria- elevate speech clarity by pt reading or repeating words on a list: 0    11: Extinction and Inattention- Use information from prior testing or double simultaneous stimuli testing to identify neglect. Face, arms, legs and visual field: 0    Total NIHSS Score: 0  Date: 7/12/2020  Time of NIHSS Assessment: 1545        SIZE OF HEMORRHAGE: n/a    CAT SCAN RESULTS: CT head - High frontal defect (patient reports Brain Cancer some years ago that involved surgery), recommending MRI    MRI RESULTS: n/a    CNS/MUSCULOSKELETAL NOTES: Patient is A&O, lives alone, son lives next door and check on her multiple times a day. Ambulates independently at baseline, been an assist x1 in ED.       GI//GY      ABDOMINAL PAIN: no    VOMITING: no    DIARRHEA: no    NAUSEA: no    GI//GY NOTES: stable    PAST MEDICAL HISTORY: HTN, HLD, Arthritis, GERD, fatigue, palpitations, cardiac stents, angina, brain cancer with surgical intervention, basal cell carcinoma of skin removed, dementia, DJD, finger fracture, TIA, CVA, vertigo, syncope, intermittent facial paralysis, COPD, CAD, DM, anxiety, does not use oxygen at home.    OTHER SYMPTOM NOTES: ED nurse assisted patient to bathroom and patient reported feeling like she was going to \"pitch backwards\" - reports she has vertigo however her vertigo symptoms typically resolves within a day and this has not.     ADDITIONAL NOTES: Patient presents to [facility] ED via EMS @ 1536 with c/o weakness and dizziness x 2-3 weeks. Stating today the symptoms had become worse.           Marla Boyd RN  7/12/2020  20:01    "

## 2020-07-13 NOTE — PROGRESS NOTES
Stroke Progress Note       Chief Complaint: Left-sided weakness worsening, headaches    Subjective    Subjective     Subjective:  Patient continues to have headaches, and complaining of left-sided lower extremity weakness.  Per patient she has been having floaters in front of her eyes, bifrontal headache, associated with nausea and light sensitivity when severe, almost on a daily basis since last 2 years when she had a stroke.  Patient also complaining of some chest pain and chest pressure along with worsening of her left lower extremity heaviness.    Review of Systems   Constitutional: Positive for fatigue.        Headache   HENT: Positive for sinus pressure and sinus pain.    Eyes: Negative.    Respiratory: Positive for chest tightness and shortness of breath.    Cardiovascular: Positive for chest pain and palpitations.   Gastrointestinal: Positive for nausea.   Endocrine: Positive for cold intolerance.   Genitourinary: Negative.    Musculoskeletal: Positive for arthralgias and myalgias.   Skin: Negative.    Allergic/Immunologic: Negative.    Psychiatric/Behavioral: Positive for decreased concentration. The patient is nervous/anxious.             Objective    Objective      Temp:  [97.8 °F (36.6 °C)-98.8 °F (37.1 °C)] 97.9 °F (36.6 °C)  Heart Rate:  [57-84] 74  Resp:  [16-18] 16  BP: (147-186)/(71-83) 147/73        Neurological Exam  Mental Status  Awake, alert and oriented to person, place and time. Recent and remote memory are intact. Speech is normal. Language is fluent with no aphasia. Attention and concentration are normal. Fund of knowledge is appropriate for level of education.    Cranial Nerves  CN II: Visual fields full to confrontation.  CN III, IV, VI: Extraocular movements intact bilaterally. Pupils equal round and reactive to light bilaterally.  CN V: Facial sensation is normal.  CN VII: Full and symmetric facial movement.  CN VIII: Hearing is normal.  CN IX, X: Palate elevates symmetrically  CN XI:  Shoulder shrug strength is normal.  CN XII: Tongue midline without atrophy or fasciculations.    Motor  Normal muscle bulk throughout. No fasciculations present.  No obvious focal weakness, although some giveaway weakness in left upper extremity.    Sensory  Sensation: Decreased sensation to light touch in left lower extremity.     Reflexes  Deep tendon reflexes are 2+ and symmetric in all four extremities with downgoing toes bilaterally.    Coordination  Finger-to-nose, rapid alternating movements and heel-to-shin normal bilaterally without dysmetria.    Gait  Not assessed.      Physical Exam   Constitutional: She appears well-developed and well-nourished.   HENT:   Head: Normocephalic and atraumatic.   Eyes: Pupils are equal, round, and reactive to light. Conjunctivae are normal.   Neck: Normal range of motion. Neck supple.   Cardiovascular: Normal rate and regular rhythm.   Pulmonary/Chest: Effort normal. No respiratory distress.   Abdominal: Soft. She exhibits no distension.   Neurological: She has normal reflexes. Coordination normal.   Psychiatric: She has a normal mood and affect. Her speech is normal and behavior is normal.   Nursing note and vitals reviewed.      Results Review:    I personally reviewed the patient's  clinical results.  MRI brain shows no acute changes, no hemorrhage, moderate white matter disease  CT angiogram of head and neck shows bilateral ICA and CCA along with aortic arch atherosclerosis, mild to moderate intracranial atherosclerosis  CT perfusion shows no perfusion deficit  Her blood pressures have been in 150-170s  Her A1c was 7.2, LDL of 12, total cholesterol of 93.               Assessment/Plan     Assessment/Plan:  This is a 80-year-old right-handed white female with history of stroke about 2 years ago with residual left-sided weakness, coronary artery disease status post stent, brain cancer status post resection (unknown details in 1963), hypertension, diabetes, COPD, frequent  falls who has come in with some chest pressure and left leg heaviness.  Associated with headaches which are migraine-like.      Migraine headaches with aura without status migrainosus, intractable.  Patient worsening of the symptoms are less likely secondary to a vascular cause.  There was some giveaway weakness on my exam as well.  Her imaging is negative for any acute changes.  Will recommend to continue aspirin 81 mg and Lipitor 80 mg daily.  Patient was also on Plavix at home, okay to continue the same.  We will also start her on Depakote 500 mg twice daily for headache prevention.  Chest pressure.  Her cardiology work-up has been negative with troponins of less than 0.01.  Less likely to be cardiac cause.  Essential hypertension.  Normal blood pressure goals.  Resume her home blood pressure medications.  Diabetes mellitus type 2.  With hyperglycemia.  A1c of 7.2.  Continue aggressive control of diabetes.  Mixed hyperlipidemia.  Her LDL is 12, and total cholesterol of 93.  Hold any statins.  Cerebral atherosclerosis.  Her blood vessels does show significant atherosclerosis intra-and extracranially.  Okay to continue dual antiplatelets.  PT/OT can work with her.  Healthy heart/diabetic diet.    Case was discussed with patient, nursing and the hospitalist.  Thank you for the consult.          Naveen Hayes MD  07/13/20  14:45

## 2020-07-13 NOTE — THERAPY EVALUATION
"Acute Care - Occupational Therapy Initial Evaluation  [facility]     Patient Name: Samantha Napoles  : 1940  MRN: [id number]  Today's Date: 2020             Admit Date: 2020     No diagnosis found.  Patient Active Problem List   Diagnosis   • Coronary artery disease with stable angina pectoris (CMS/HCC)   • HTN (hypertension)   • Hypertensive heart disease   • Dyslipidemia   • Degenerative arthritis   • GERD (gastroesophageal reflux disease)   • TIA (transient ischemic attack)   • History of CVA (cerebrovascular accident)   • History of brain cancer   • Type 2 diabetes mellitus (CMS/HCC)     No past medical history on file.  Past Surgical History:   Procedure Laterality Date   • BRAIN SURGERY      \"brain tumor surgery\"; incomplete database   • CHOLECYSTECTOMY            OT ASSESSMENT FLOWSHEET (last 12 hours)      Occupational Therapy Evaluation     Row Name 20 1002                   OT Evaluation Time/Intention    Subjective Information  complains of;weakness on L side  -RIKKI        Document Type  evaluation  -RIKKI        Mode of Treatment  occupational therapy  -RIKKI        Patient Effort  good  -RIKKI        Symptoms Noted During/After Treatment  fatigue  -RIKKI           General Information    Patient Profile Reviewed?  yes  -RIKKI        Patient Observations  alert;cooperative  -RIKKI        Prior Level of Function  independent:;ADL's;home management;all household mobility;community mobility;shopping;driving  -RIKKI        Equipment Currently Used at Home  cane, straight;shower chair;walker, rolling  -RIKKI        Existing Precautions/Restrictions  fall  -RIKKI        Limitations/Impairments  safety/cognitive  -RIKKI           Relationship/Environment    Primary Source of Support/Comfort  child(earl) adult son  -RIKKI        Lives With  alone  -RIKKI        Primary Roles/Responsibilities  retired  -RIKKI           Resource/Environmental Concerns    Current Living Arrangements  home/apartment/condo  -RIKKI           Home Main " Entrance    Number of Stairs, Main Entrance  none  -RIKKI           Stairs Within Home, Primary    Number of Stairs, Within Home, Primary  none  -RIKKI           Cognitive Assessment/Intervention- PT/OT    Orientation Status (Cognition)  oriented x 3  -RIKKI        Follows Commands (Cognition)  WFL  -RIKKI        Safety Deficit (Cognitive)  insight into deficits/self awareness;judgment;problem solving;awareness of need for assistance;safety precautions awareness;safety precautions follow-through/compliance  -RIKKI           Safety Issues, Functional Mobility    Safety Issues Affecting Function (Mobility)  insight into deficits/self awareness;judgment;problem solving;safety precaution awareness;safety precautions follow-through/compliance;awareness of need for assistance  -RIKKI        Impairments Affecting Function (Mobility)  balance;endurance/activity tolerance;grasp;pain;postural/trunk control;sensation/sensory awareness;shortness of breath;strength  -           Functional Mobility    Functional Mobility- Ind. Level  contact guard assist;verbal cues required  -        Functional Mobility- Device  rolling walker  -        Functional Mobility-Distance (Feet)  20  -RIKKI        Functional Mobility- Safety Issues  balance decreased during turns;step length decreased;loses balance backward  -        Functional Mobility- Comment  pt ambulated to/from bathroom from bedside   -           Transfer Assessment/Treatment    Transfer Assessment/Treatment  sit-stand transfer;stand-sit transfer;stand pivot/stand step transfer;toilet transfer  -           Sit-Stand Transfer    Sit-Stand [place] (Transfers)  contact guard;verbal cues  -        Assistive Device (Sit-Stand Transfers)  walker, front-wheeled  -RIKKI           Stand-Sit Transfer    Stand-Sit [place] (Transfers)  contact guard;verbal cues  -        Assistive Device (Stand-Sit Transfers)  walker, front-wheeled  -RIKKI           Stand Pivot/Stand Step Transfer    Stand  Pivot/Stand Step [place]  contact guard;verbal cues  -RIKKI        Assistive Device (Stand Pivot Stand Step Transfer)  walker, front-wheeled  -           Toilet Transfer    Type (Toilet Transfer)  sit-stand;stand-sit;stand pivot/stand step  -RIKKI        [place] Level (Toilet Transfer)  contact guard;verbal cues  -RIKKI        Assistive Device (Toilet Transfer)  raised toilet seat;grab bars/safety frame;walker, front-wheeled  -           ADL Assessment/Intervention    BADL Assessment/Intervention  grooming;lower body dressing  -           Lower Body Dressing Assessment/Training    Lower Body Dressing [place] Level  maximum assist (25% patient effort);doff;don;socks  -        Lower Body Dressing Position  supported sitting  -RIKKI        Comment (Lower Body Dressing)  decreased coordination for bimanual tasks  -RIKKI           Grooming Assessment/Training    [place] Level (Grooming)  set up;supervision;verbal cues;wash face, hands;oral care regimen  -RIKKI        Grooming Position  supported standing leaning forward against sink countertop  -RIKKI        Comment (Grooming)  decreased coordination for bimanual tasks  -           BADL Safety/Performance    Impairments, BADL Safety/Performance  balance;cognition;endurance/activity tolerance;grasp/prehension;coordination;motor control;strength;trunk/postural control  -        Skilled BADL Treatment/Intervention  BADL process/adaptation training  -RIKKI        Progress in BADL Status  cueing required  -RIKKI           General ROM    GENERAL ROM COMMENTS  BUE AROM WFL  -RIKKI           MMT (Manual Muscle Testing)    General MMT Comments  RUE grossly 4/5; LUE grossly 3+/5  -RIKKI           Static Sitting Balance    Level of [place] (Unsupported Sitting, Static Balance)  supervision  -RIKKI        Sitting Position (Unsupported Sitting, Static Balance)  sitting on edge of bed  -RIKKI        Time Able to Maintain Position (Unsupported Sitting, Static Balance)  2 to 3 minutes   -RIKKI           Static Standing Balance    Level of [place] (Supported Standing, Static Balance)  contact guard assist  -RIKKI        Time Able to Maintain Position (Supported Standing, Static Balance)  2 to 3 minutes  -RIKKI        Assistive Device Utilized (Supported Standing, Static Balance)  walker, rolling  -RIKKI           Sensory Assessment/Intervention    Sensory General Assessment  no sensation deficits identified;other (see comments) BUE's  -RIKKI           Positioning and Restraints    Pre-Treatment Position  in bed  -RIKKI        Post Treatment Position  chair  -RIKKI        In Chair  reclined;call light within reach;encouraged to call for assist;exit alarm on;waffle cushion;legs elevated  -RIKKI           Pain Scale: Numbers Pre/Post-Treatment    Pain Location - Side  Bilateral  -RIKKI        Pain Location - Orientation  lower  -RIKKI        Pain Location  extremity  -RIKKI        Pain Intervention(s)  Rest  -RIKKI           Pain Scale: FACES Pre/Post-Treatment    Pain: FACES Scale, Pretreatment  4-->hurts little more  -RIKKI        Pain: FACES Scale, Post-Treatment  4-->hurts little more  -RIKKI        Pre/Post Treatment Pain Comment  pt also reported persistent headache running across area above eyebrows  -RIKKI           Coping    Observed Emotional State  calm;cooperative  -RIKKI        Verbalized Emotional State  acceptance  -RIKKI           Plan of Care Review    Plan of Care Reviewed With  patient  -RIKKI           Clinical Impression (OT)    OT Diagnosis  ADL decline  -RIKKI        Patient/Family Goals Statement (OT Eval)  to return to prior level of independence  -RIKKI        Criteria for Skilled Therapeutic Interventions Met (OT Eval)  yes;treatment indicated  -RIKKI        Rehab Potential (OT Eval)  good, to achieve stated therapy goals  -RIKKI        Therapy Frequency (OT Eval)  daily  -RIKKI        Care Plan Review (OT)  evaluation/treatment results reviewed  -RIKKI        Anticipated Equipment Needs at Discharge (OT)  other (see comments) versa frame  for toilet area  -RIKKI           Vital Signs    Pre Systolic BP Rehab  173  -RIKKI        Pre Treatment Diastolic BP  83  -RIKKI        Intra Systolic BP Rehab  150  -RIKKI        Intra Treatment Diastolic BP  79  -RIKKI        Post Systolic BP Rehab  159  -RIKKI        Post Treatment Diastolic BP  79  -RIKKI        Pretreatment Heart Rate (beats/min)  74  -RIKKI        Intratreatment Heart Rate (beats/min)  73  -RIKKI        Posttreatment Heart Rate (beats/min)  72  -RIKKI        Pre SpO2 (%)  95  -RIKKI        O2 Delivery Pre Treatment  room air  -RIKKI        Intra SpO2 (%)  96  -RIKKI        O2 Delivery Intra Treatment  room air  -RIKKI        Post SpO2 (%)  96  -RIKKI        O2 Delivery Post Treatment  room air  -RIKKI        Pre Patient Position  Sitting  -RIKKI        Intra Patient Position  Standing  -RIKKI        Post Patient Position  Sitting  -RIKKI           Planned OT Interventions    Planned Therapy Interventions (OT Eval)  activity tolerance training;BADL retraining;functional balance retraining;ROM/therapeutic exercise;strengthening exercise;transfer/mobility retraining;cognitive/visual perception retraining  -RIKKI           OT Goals    Transfer Goal Selection (OT)  transfer, OT goal 1  -RIKKI        Dressing Goal Selection (OT)  dressing, OT goal 1  -RIKKI        Toileting Goal Selection (OT)  toileting, OT goal 1  -RIKKI           Transfer Goal 1 (OT)    Activity/Assistive Device (Transfer Goal 1, OT)  sit-to-stand/stand-to-sit;bed-to-chair/chair-to-bed;toilet;walker, rolling  -RIKKI        [place] Level/Cues Needed (Transfer Goal 1, OT)  supervision required  -RIKKI        Time Frame (Transfer Goal 1, OT)  10 days  -RIKKI        Progress/Outcome (Transfer Goal 1, OT)  goal ongoing  -RIKKI           Dressing Goal 1 (OT)    Activity/Assistive Device (Dressing Goal 1, OT)  dressing skills, all  -RIKKI        [place]/Cues Needed (Dressing Goal 1, OT)  set-up required;supervision required  -RIKKI        Time Frame (Dressing Goal 1, OT)  10 days  -RIKKI        Progress/Outcome  (Dressing Goal 1, OT)  goal ongoing  -RIKKI           Toileting Goal 1 (OT)    Activity/Device (Toileting Goal 1, OT)  adjust/manage clothing;perform perineal hygiene;raised toilet seat;grab bar/safety frame  -RKIKI        [place] Level/Cues Needed (Toileting Goal 1, OT)  supervision required  -RIKKI        Time Frame (Toileting Goal 1, OT)  10 days  -RIKKI        Progress/Outcome (Toileting Goal 1, OT)  goal ongoing  -RIKKI           Living Environment    Home Accessibility  tub/shower is not walk in  -RIKKI          User Key  (r) = Recorded By, (t) = Taken By, (c) = Cosigned By    Initials Name Effective Dates    Sherri Henderson, OT 02/14/20 -          Occupational Therapy Education                 Title: PT OT SLP Therapies (Done)     Topic: Occupational Therapy (Done)     Point: ADL training (Done)     Description:   Instruct learner(s) on proper safety adaptation and remediation techniques during self care or transfers.   Instruct in proper use of assistive devices.              Learning Progress Summary           Patient Acceptance, E, VU,NR by RIKKI at 7/13/2020 1002    Comment:  Role of OT                   Point: Home exercise program (Done)     Description:   Instruct learner(s) on appropriate technique for monitoring, assisting and/or progressing therapeutic exercises/activities.              Learning Progress Summary           Patient Acceptance, E, VU,NR by RIKKI at 7/13/2020 1002    Comment:  Role of OT                   Point: Precautions (Done)     Description:   Instruct learner(s) on prescribed precautions during self-care and functional transfers.              Learning Progress Summary           Patient Acceptance, E, VU,NR by RIKKI at 7/13/2020 1002    Comment:  Role of OT                   Point: Body mechanics (Done)     Description:   Instruct learner(s) on proper positioning and spine alignment during self-care, functional mobility activities and/or exercises.              Learning Progress Summary            Patient Acceptance, E, RANJEET,NR by RIKKI at 7/13/2020 1002    Comment:  Role of OT                               User Key     Initials Effective Dates Name Provider Type Discipline     02/14/20 -  Sherri Ken, OT Occupational Therapist OT                  OT Recommendation and Plan  Outcome Summary/Treatment Plan (OT)  Anticipated Equipment Needs at Discharge (OT): other (see comments)(versa frame for toilet area)  Planned Therapy Interventions (OT Eval): activity tolerance training, BADL retraining, functional balance retraining, ROM/therapeutic exercise, strengthening exercise, transfer/mobility retraining, cognitive/visual perception retraining  Therapy Frequency (OT Eval): daily  Plan of Care Review  Plan of Care Reviewed With: patient  Plan of Care Reviewed With: patient  Outcome Summary: OT evaluation complete. Pt presents with L-sided weakness, fatigue, and decreased insight into fxl deficits impacting ADL performance. Pt ambulated to/from bathroom using RW with CGA and vc's for safety, completing STS/stand pivot transfer on/off toilet with CGA. Pt completed grooming standing at sink with FLEMING, limited by weakness in L hand and standing balance. Pt required Max A for LB dressing. Pt will benefit from skilled OT services to increase independence with self-care. Recommend IRF at discharge..    Outcome Measures     Row Name 07/13/20 1002             How much help from another is currently needed...    Putting on and taking off regular lower body clothing?  2  -RIKKI      Bathing (including washing, rinsing, and drying)  2  -RIKKI      Toileting (which includes using toilet bed pan or urinal)  3  -RIKKI      Putting on and taking off regular upper body clothing  3  -RIKKI      Taking care of personal grooming (such as brushing teeth)  3  -RIKKI      Eating meals  3  -RIKKI      AM-PAC 6 Clicks Score (OT)  16  -RIKKI         Functional Assessment    Outcome Measure Options  AM-PAC 6 Clicks Daily Activity (OT)  -RIKKI        User Key  (r) =  Recorded By, (t) = Taken By, (c) = Cosigned By    Initials Name Provider Type    Sherri Henderson OT Occupational Therapist          Time Calculation:   Time Calculation- OT     Row Name 07/13/20 1105             Time Calculation- OT    OT Start Time  1002  -RIKKI      OT Received On  07/13/20  -RIKKI      OT Goal Re-Cert Due Date  07/23/20  -RIKKI        User Key  (r) = Recorded By, (t) = Taken By, (c) = Cosigned By    Initials Name Provider Type    Sherri Henderson OT Occupational Therapist        Therapy Charges for Today     Code Description Service Date Service Provider Modifiers Qty    [id number] HC OT EVAL MOD COMPLEXITY 4 7/13/2020 Sherri Ken OT GO 1               Sherri Ken OT  7/13/2020

## 2020-07-13 NOTE — PROGRESS NOTES
[facility] Medicine Services  PROGRESS NOTE    Patient Name: Samantha Napoles  : 1940  MRN: [id number]    Date of Admission: 2020  Primary Care Physician: Marc Salcedo MD    Subjective   Subjective     CC:  Follow up concerns for stroke/TIA    HPI:  Lt leg does feel a bit better than at OSH but is still feeling a little weak. Can lift it now and says she wasn't able to yesterday. Had some low back pain but that started AFTER her leg weakness started. No more chest pressure    Review of Systems  Gen- No fevers, chills  CV- No chest pain, palpitations  Resp- No cough, dyspnea  GI- No N/V/D, abd pain    Objective   Objective     Vital Signs:   Temp:  [97.8 °F (36.6 °C)-98.8 °F (37.1 °C)] 97.8 °F (36.6 °C)  Heart Rate:  [57-84] 65  Resp:  [16-18] 18  BP: (159-173)/(75-83) 173/83  Total (NIH Stroke Scale): 7     Physical Exam:  Constitutional: No acute distress, awake, alert, elderly female laying in hospital bed  HENT: NCAT, mucous membranes moist  Respiratory: Clear to auscultation bilaterally, respiratory effort normal   Cardiovascular: RRR, 2/6 systolic murmur, palpable pedal pulses bilaterally  Gastrointestinal: Positive bowel sounds, soft, nontender, nondistended  Musculoskeletal: No bilateral ankle edema  Psychiatric: Appropriate affect, cooperative  Neurologic: Oriented x 3, speech clear, intermittent whole body jerking motions (per PT chronic since stroke 2 years ago), LT LE does appear modestly weaker than the right but able to lift and move without issue    Results Reviewed:  Results from last 7 days   Lab Units 20  0336   WBC 10*3/mm3 7.48   HEMOGLOBIN g/dL 12.3   HEMATOCRIT % 37.9   PLATELETS 10*3/mm3 196     Results from last 7 days   Lab Units 20  0336   SODIUM mmol/L 143   POTASSIUM mmol/L 3.5   CHLORIDE mmol/L 104   CO2 mmol/L 24.0   BUN mg/dL 17   CREATININE mg/dL 0.77   GLUCOSE mg/dL 183*   CALCIUM mg/dL 9.1   TROPONIN T ng/mL <0.010     Estimated  Creatinine Clearance: 58.8 mL/min (by C-G formula based on SCr of 0.77 mg/dL).    Microbiology Results Abnormal     None          Imaging Results (Last 24 Hours)     Procedure Component Value Units Date/Time    MRI Brain With & Without Contrast [[id number]] Collected:  07/13/20 0128     Updated:  07/13/20 0130    Narrative:       MRI Brain WO W    INDICATION:   Generalized weakness and dizziness for 2 weeks, worsening. Seizure activity. Blurred vision and acute headache.    TECHNIQUE:   MRI of the brain with and without IV contrast. 15 cc of MultiHance was administered.    COMPARISON:    10/20/2017    FINDINGS:  Diffusion images show no acute or subacute infarct. Ventricular size and configuration are within normal limits. There is generalized atrophy. Chronic small vessel ischemic changes are present in the white matter, slightly progressive from prior. No  acute or chronic hemorrhage is identified. Major intracranial flow voids are maintained. No masses or pathologic contrast enhancement are identified. Patient is status post craniotomy at the vertex. Bilateral hippocampal formations are symmetric.  Craniovertebral junction is normal.      Impression:         1. No acute findings in the brain.  2. Atrophy with slight progression of chronic small vessel ischemic disease in the white matter.  3. No masses or pathologic contrast enhancement are seen.    Signer Name: Jose Angel Hilliard MD   Signed: 7/13/2020 1:28 AM   Workstation Name: [email or web address]    [facility]    CT Angiogram Head [[id number]] Collected:  07/13/20 0049     Updated:  07/13/20 0051    Narrative:       CTA Head, CTA Neck    INDICATION:   Generalized weakness with dizziness for 2 weeks, worsening.    TECHNIQUE:   CT angiogram of the head and neck with and without IV contrast. 3-D reconstructions were obtained and reviewed.  Evaluation for a significant carotid arterial stenosis is based on the NASCET criteria. Radiation dose reduction  techniques included  automated exposure control or exposure modulation based on body size. Count of known CT and cardiac nuc med studies performed in previous 12 months: 1.     COMPARISON:   CT head 2/3/2020    FINDINGS:   CTA neck:  Lung apices are clear. There is atherosclerotic disease in the arch and great vessels. No significant stenosis is seen. There is fairly extensive plaque at the bifurcations, left greater than right. There are less than 50% stenoses in the  proximal ICAs. Vertebral arteries are codominant and widely patent. No carotid or vertebral arterial dissection.    CTA head:  There is plaque in both carotid siphons. No significant stenosis is seen. Vertebrobasilar system is normal. The anterior, middle, and posterior cerebral arteries appear widely patent. No flow-limiting stenosis or vessel cut off is identified.  Major dural venous sinuses are patent. Patient is status post craniotomy at the vertex. No definite aneurysm is seen.      Impression:         1. Plaque at both carotid bifurcations, but less than 50% stenosis in both ICAs.  2. No carotid or vertebral dissection.  3. Mild intracranial atherosclerotic disease at the carotid siphons. No intracranial flow-limiting stenosis or vessel cut off. No definite aneurysm.    Signer Name: Jose Angel Hilliard MD   Signed: 7/13/2020 12:49 AM   Workstation Name: [email or web address]    [facility]    CT Angiogram Neck [[id number]] Collected:  07/13/20 0049     Updated:  07/13/20 0051    Narrative:       CTA Head, CTA Neck    INDICATION:   Generalized weakness with dizziness for 2 weeks, worsening.    TECHNIQUE:   CT angiogram of the head and neck with and without IV contrast. 3-D reconstructions were obtained and reviewed.  Evaluation for a significant carotid arterial stenosis is based on the NASCET criteria. Radiation dose reduction techniques included  automated exposure control or exposure modulation based on body size. Count of known CT  and cardiac nuc med studies performed in previous 12 months: 1.     COMPARISON:   CT head 2/3/2020    FINDINGS:   CTA neck:  Lung apices are clear. There is atherosclerotic disease in the arch and great vessels. No significant stenosis is seen. There is fairly extensive plaque at the bifurcations, left greater than right. There are less than 50% stenoses in the  proximal ICAs. Vertebral arteries are codominant and widely patent. No carotid or vertebral arterial dissection.    CTA head:  There is plaque in both carotid siphons. No significant stenosis is seen. Vertebrobasilar system is normal. The anterior, middle, and posterior cerebral arteries appear widely patent. No flow-limiting stenosis or vessel cut off is identified.  Major dural venous sinuses are patent. Patient is status post craniotomy at the vertex. No definite aneurysm is seen.      Impression:         1. Plaque at both carotid bifurcations, but less than 50% stenosis in both ICAs.  2. No carotid or vertebral dissection.  3. Mild intracranial atherosclerotic disease at the carotid siphons. No intracranial flow-limiting stenosis or vessel cut off. No definite aneurysm.    Signer Name: Jose Angel Hilliard MD   Signed: 7/13/2020 12:49 AM   Workstation Name: [email or web address]    [facility]    CT Cerebral Perfusion With & Without Contrast [[id number]] Collected:  07/13/20 0024     Updated:  07/13/20 0026    Narrative:       CT CEREBRAL PERFUSION W WO CONTRAST    HISTORY:   Generalized weakness and dizziness for 2 weeks, worsening.    TECHNIQUE:   Axial CT images of the brain without and with intravenous contrast using cerebral perfusion protocol. Post-processing parametric maps were created using RAPID software and reviewed. Radiation dose reduction techniques included automated exposure control  or exposure modulation based on body size. CT and nuclear cardiology exams in the last 12 months: 1.    COMPARISON:   CT head 2/3/2020    FINDINGS:    Arterial input function is optimal.     Perfusion maps are symmetric without evidence of cerebral ischemia or core infarction.      Impression:       Normal brain perfusion CT.          Signer Name: Jose Angel Hilliard MD   Signed: 7/13/2020 12:24 AM   Workstation Name: [facility]    [facility]               I have reviewed the medications:  Scheduled Meds:  aspirin 81 mg Oral Daily   Or      aspirin 300 mg Rectal Daily   atorvastatin 80 mg Oral Nightly   cetirizine 5 mg Oral Daily   clopidogrel 75 mg Oral Daily   donepezil 10 mg Oral Nightly   gabapentin 600 mg Oral Q12H   insulin lispro 0-7 Units Subcutaneous TID AC   montelukast 10 mg Oral Nightly   pantoprazole 40 mg Oral Q AM   sodium chloride 10 mL Intravenous Q12H   sodium chloride 10 mL Intravenous Q12H     Continuous Infusions:   PRN Meds:.•  acetaminophen **OR** acetaminophen **OR** acetaminophen  •  dextrose  •  dextrose  •  glucagon (human recombinant)  •  potassium chloride  •  potassium chloride  •  sodium chloride  •  sodium chloride    Assessment/Plan   Assessment & Plan     Active Hospital Problems    Diagnosis  POA   • **TIA (transient ischemic attack) [G45.9]  Yes   • Type 2 diabetes mellitus (CMS/HCC) [E11.9]  Yes   • Angina pectoris (CMS/HCC) [I20.9]  Yes   • Questionable Syncope [R55]  Yes   • History of CVA (cerebrovascular accident) [Z86.73]  Not Applicable   • History of brain cancer [Z85.841]  Not Applicable   • Coronary artery disease [I25.10]  Yes   • Degenerative arthritis [M19.90]  Yes   • Dyslipidemia [E78.5]  Yes   • GERD (gastroesophageal reflux disease) [K21.9]  Yes   • HTN (hypertension) [I10]  Yes      Resolved Hospital Problems   No resolved problems to display.        Brief Hospital Course to date:  Samantha Napoles is a 80 y.o. female with remote brain tumor (1962), DM2, CAD, prior stroke with residual neurologic abnormalities, who presents in transfer from [facility] for LT sided weakness and  numbness which are improving    The following problems are new to me today    Assessment/Plan    Probable TIA  - CTA head and neck without substantial disease, CT perfusion WNL  - MRI here without evidence for acute stroke  - based on testing by admitting physician does appear that her LT weakness is getting better  - echo pending  - cont ASA, statin, plavix, she is also on Repatha outpatient  - neuro to see today    Chest pressure  CAD with prior stenting  - relieved with nitro at home  - troponin had not been ordered, added on this AM and was negative  - vascular meds as noted above  - does have known distal disease not amendable to stenting, consideration of uptitrating her imdur or adding further antianginal - cardiology already consulted, follows with Dr. Reyna    DM type 2, a1c 7.2%  - cont ssi    HLD  - as noted, on crestor and Repatha at home    HTN  - resume vasotec given negative MRI    Dementia, mild  - cont namenda    DVT Prophylaxis:  mechanical    Disposition: I expect the patient to be medically ready for discharge potentially as early as tomorrow to allow for cardiology and neurology to assess and make and further diagnostic/therapeutic decisions, PT recommended IPR which may delay discharge and will order screening covid to facilitate possible placement.    CODE STATUS:   Code Status and Medical Interventions:   Ordered at: 07/12/20 8079     Limited Support to NOT Include:    Intubation     Level Of Support Discussed With:    Patient     Code Status:    CPR     Medical Interventions (Level of Support Prior to Arrest):    Limited         Electronically signed by Van Rubi DO, 07/13/20, 11:49.

## 2020-07-13 NOTE — PLAN OF CARE
Pt arrived from [place]. VSS, permissive HTN. RA. Reports headache, relieved with tylenol. MRI and CT completed.

## 2020-07-13 NOTE — CONSULTS
[facility]        Date of Hospital Visit: 20      Place of Service: [facility]    Patient Name: Samantha Napoles  :1940    Referral Provider: Hospitalist  Primary Care Provider: Marc Salcedo MD    Chief complaint/Reason for Consultation:  Chest Pain, CAD and syncope      Problem List:  Patient Active Problem List    Diagnosis    1 *TIA (transient ischemic attack)    2 Angina pectoris (CMS/HCC)    3 Questionable Syncope    4 Coronary artery disease      a. Cardiac catheterization followed by stent of right coronary artery.  b. LHC  2005 by Dr. Reyna, showed widely patent stent of the right coronary artery, 95% stenosis in very distal/apical segment of LAD and a small caliber vessel (not amenable to catheter-based intervention),ejection fraction was 60%.  c. Echocardiogram, 2007, showed normal LV function, moderate LVH, ejection fraction of 65% to 70%, diastolic dysfunction.  d. Cardiac catheterization, 2013, showed nonobstructive disease of the major vessels without hemodynamically significant disease, small caliber distal/apical LAD with a 60% to 70% which was felt to be the etiology of apical ischemia noted on the stress test.  Ejection fraction was 65%.  This lesion was not amenable to intervention, and medical therapy was recommended.   5 HTN (hypertension)    6 Hypertensive heart disease    7 Dyslipidemia    8 Type 2 diabetes mellitus (CMS/HCC)    9 History of CVA (cerebrovascular accident)    10 History of brain cancer    11 Degenerative arthritis    12 GERD (gastroesophageal reflux disease)      History of Present Illness:  This is a 80-year-old hypertensive dyslipidemic female with known coronary artery disease.  She was last seen by our service in .  She presented to [facility] with a complaint of left lower extremity heaviness.  She had a CT of the head showing mild atrophy.  Her blood pressure was elevated at  "180 mmHg.  She was transferred to [facility] for higher level of care to rule out TIA versus CVA.  During her evaluation in [facility] she was noted to have chest pain at home with sublingual nitroglycerin use.  Her troponin was within normal limits.  Her EKGs show no ST elevations or arrhythmias.  We are consulted for chest pain and syncope.  The patient states that she has been having intermittent syncopal episodes with brief warning that includes visual changes.  She has had a few falls.  She states she had a syncopal episode at [facility] though the available records make no mention of syncope.  She mentions this as passing out while she was sitting on the side of the bed and states that she fell backwards.  She recalls another episode when she was walking back from her son's house and states that the next thing she recalls is she had fell over and her son's dog was checking on her.  She is unaware of any tachyarrhythmias.  Further states she has been using sublingual nitroglycerin periodically for chest pain.  She states she had an episode of chest pain this past Friday that was severe in nature and relieved by a single nitroglycerin.  She denies shortness of breath, nausea, diaphoresis or radiating pain.  She denies orthopnea, PND, claudication.  She continues to complain of numbness and tingling of the left lower extremity.  Further states she has had some recent unilateral edema.      Past Surgical History:   Procedure Laterality Date   • BRAIN SURGERY  1963    \"brain tumor surgery\"; incomplete database   • CHOLECYSTECTOMY         Allergies   Allergen Reactions   • Iodine Solution [Povidone Iodine]      P.O. Iodine - no allergy to IV contrast   • Penicillins    • Phenergan [Promethazine]        Medications Prior to Admission   Medication Sig Dispense Refill Last Dose   • aspirin 325 MG tablet Take 1 each by mouth Daily.      • enalapril (VASOTEC) 10 MG tablet Take 10 mg by mouth 2 " (two) times a day.   7/12/2020 at 0900   • gabapentin (NEURONTIN) 600 MG tablet Take 600 mg by mouth 2 (two) times a day.   7/12/2020 at 0900   • isosorbide mononitrate (IMDUR) 60 MG 24 hr tablet Take 60 mg by mouth daily.   7/12/2020 at 0900   • LORazepam (ATIVAN) 0.5 MG tablet Take 0.5 mg by mouth Every 8 (Eight) Hours As Needed for Sedation (takes qhs by PCP).      • metFORMIN (GLUCOPHAGE) 500 MG tablet Take 500 mg by mouth 2 (two) times a day with meals.   7/12/2020 at 0900   • montelukast (SINGULAIR) 10 MG tablet Take 10 mg by mouth every night.   7/12/2020 at 0900   • rosuvastatin (CRESTOR) 20 MG tablet Take 20 mg by mouth daily.   7/12/2020 at 0900   • traMADol (ULTRAM) 50 MG tablet Take 50 mg by mouth every 6 (six) hours as needed for moderate pain (4-6).   7/12/2020 at 0900   • clopidogrel (PLAVIX) 75 MG tablet Take 75 mg by mouth daily.   7/12/2020 at 0900   • donepezil (ARICEPT) 10 MG tablet Take 10 mg by mouth every night.   7/12/2020 at 0900   • fexofenadine (ALLEGRA) 180 MG tablet Take 180 mg by mouth daily.   More than a month at Unknown time   • pantoprazole (PROTONIX) 40 MG EC tablet Take 40 mg by mouth daily.   7/12/2020 at 0900         Current Facility-Administered Medications:   •  acetaminophen (TYLENOL) tablet 650 mg, 650 mg, Oral, Q4H PRN, 650 mg at 07/13/20 0246 **OR** acetaminophen (TYLENOL) 160 MG/5ML solution 650 mg, 650 mg, Oral, Q4H PRN **OR** acetaminophen (TYLENOL) suppository 650 mg, 650 mg, Rectal, Q4H PRN, Bren Millan, APRN  •  aspirin chewable tablet 81 mg, 81 mg, Oral, Daily, 81 mg at 07/13/20 0935 **OR** aspirin suppository 300 mg, 300 mg, Rectal, Daily, Aisha Valdivia, APRN  •  atorvastatin (LIPITOR) tablet 80 mg, 80 mg, Oral, Nightly, Aisha Valdivia APRN, 80 mg at 07/13/20 0241  •  cetirizine (zyrTEC) tablet 5 mg, 5 mg, Oral, Daily, Monty, Bren, MARVIN, 5 mg at 07/13/20 0935  •  clopidogrel (PLAVIX) tablet 75 mg, 75 mg, Oral, Daily, Aisha Valdivia APRN,  75 mg at 20 0935  •  dextrose (D50W) 25 g/ 50mL Intravenous Solution 25 g, 25 g, Intravenous, Q15 Min PRN, Monty, Bren, APRN  •  dextrose (GLUTOSE) oral gel 15 g, 15 g, Oral, Q15 Min PRN, Monty, Bren, APRN  •  donepezil (ARICEPT) tablet 10 mg, 10 mg, Oral, Nightly, Monty, Bren, APRN, 10 mg at 20 0242  •  gabapentin (NEURONTIN) capsule 600 mg, 600 mg, Oral, Q12H, Monty, Bren, APRN, 600 mg at 20 0242  •  glucagon (human recombinant) (GLUCAGEN DIAGNOSTIC) injection 1 mg, 1 mg, Subcutaneous, Q15 Min PRN, Monty, Bren, APRN  •  insulin lispro (humaLOG) injection 0-7 Units, 0-7 Units, Subcutaneous, TID AC, Monty, Bren, APRN  •  montelukast (SINGULAIR) tablet 10 mg, 10 mg, Oral, Nightly, Monty, Bren, APRN, 10 mg at 20 0241  •  pantoprazole (PROTONIX) EC tablet 40 mg, 40 mg, Oral, Q AM, Monty, Bren, APRN  •  potassium chloride (KLOR-CON) packet 40 mEq, 40 mEq, Oral, PRN, Van Rubi, DO  •  potassium chloride (MICRO-K) CR capsule 40 mEq, 40 mEq, Oral, PRN, Van Rubi, DO  •  sodium chloride 0.9 % flush 10 mL, 10 mL, Intravenous, Q12H, Aisha Valdivia, APRN  •  sodium chloride 0.9 % flush 10 mL, 10 mL, Intravenous, PRN, Aisha Valdivia, APRN  •  sodium chloride 0.9 % flush 10 mL, 10 mL, Intravenous, Q12H, Monty, Bren, APRN, 10 mL at 20 0935  •  sodium chloride 0.9 % flush 10 mL, 10 mL, Intravenous, PRN, Monty, Bren, APRN      Social History     Socioeconomic History   • Marital status:      Spouse name: Not on file   • Number of children: Not on file   • Years of education: Not on file   • Highest education level: Not on file   Tobacco Use   • Smoking status: Former Smoker     Last attempt to quit: 1985     Years since quittin.9   • Smokeless tobacco: Never Used   Substance and Sexual Activity   • Alcohol use: No   • Drug use: No   • Sexual activity: Defer       Family History   Problem Relation Age of  "Onset   • Stroke Mother    • Heart disease Father        REVIEW OF SYSTEMS:   Review of Systems   Constitution: Negative.   HENT: Negative.    Eyes: Negative.    Cardiovascular: Positive for chest pain and syncope.   Respiratory: Negative.    Endocrine: Negative.    Hematologic/Lymphatic: Negative.    Skin: Negative.    Musculoskeletal: Negative.    Gastrointestinal: Negative.    Genitourinary: Negative.    Neurological: Positive for disturbances in coordination, focal weakness and paresthesias.   Psychiatric/Behavioral: Negative.    Allergic/Immunologic: Negative.    All other systems reviewed and are negative.           Objective:  Vitals:    07/13/20 0321 07/13/20 0802 07/13/20 0900 07/13/20 1100   BP: 169/79 173/83     BP Location: Left arm Left arm     Patient Position: Lying Lying     Pulse: 76 71 57 65   Resp: 16 18     Temp: 97.8 °F (36.6 °C) 97.8 °F (36.6 °C)     TempSrc: Oral Oral     SpO2: 96% 100% 93%    Weight:       Height:         Body mass index is 26.44 kg/m².  Flowsheet Rows      First Filed Value   Admission Height  167.6 cm (66\") Documented at 07/13/2020 0100   Admission Weight  74.3 kg (163 lb 12.8 oz) Documented at 07/13/2020 0100          Intake/Output Summary (Last 24 hours) at 7/13/2020 1119  Last data filed at 7/13/2020 0802  Gross per 24 hour   Intake 340 ml   Output 800 ml   Net -460 ml       Physical Exam   Constitutional: She is oriented to person, place, and time. She appears well-developed and well-nourished. No distress.   HENT:   Head: Normocephalic and atraumatic.   Mouth/Throat: Oropharynx is clear and moist.   Eyes: Pupils are equal, round, and reactive to light. No scleral icterus.   Neck: Neck supple. No JVD present. No tracheal deviation present. No thyromegaly present.   Cardiovascular: Normal rate, regular rhythm and normal heart sounds. Exam reveals no gallop and no friction rub.   No murmur heard.  Pulmonary/Chest: Effort normal and breath sounds normal. No respiratory " distress. She has no wheezes. She has no rales.   Abdominal: Soft. Bowel sounds are normal. She exhibits no distension and no mass. There is no tenderness. There is no rebound and no guarding.   Musculoskeletal: Normal range of motion. She exhibits no edema or deformity.   Lymphadenopathy:     She has no cervical adenopathy.   Neurological: She is alert and oriented to person, place, and time. No cranial nerve deficit.   Skin: Skin is warm and dry. No rash noted. She is not diaphoretic.   Psychiatric: She has a normal mood and affect.   Nursing note and vitals reviewed.        Lab Review:                Results from last 7 days   Lab Units 07/13/20  0336   SODIUM mmol/L 143   POTASSIUM mmol/L 3.5   CHLORIDE mmol/L 104   CO2 mmol/L 24.0   BUN mg/dL 17   CREATININE mg/dL 0.77   GLUCOSE mg/dL 183*   CALCIUM mg/dL 9.1     Results from last 7 days   Lab Units 07/13/20  0336   WBC 10*3/mm3 7.48   HEMOGLOBIN g/dL 12.3   HEMATOCRIT % 37.9   PLATELETS 10*3/mm3 196         Lab Results   Component Value Date    CHOL 93 07/13/2020     Lab Results   Component Value Date    TRIG 153 (H) 07/13/2020     Lab Results   Component Value Date    HDL 50 07/13/2020     Lab Results   Component Value Date    LDL 12 07/13/2020     Lab Results   Component Value Date    HGBA1C 7.20 (H) 07/13/2020     Estimated Creatinine Clearance: 58.8 mL/min (by C-G formula based on SCr of 0.77 mg/dL).  Results from last 7 days   Lab Units 07/13/20  0336   TROPONIN T ng/mL <0.010           EKG: pending      Assessment :   Reported history of episodes of syncope, nonspecific, unclear whether these are related to orthostasis or any neurologic etiology.  No associated palpitations to suggest arrhythmias.  History of small vessel CAD not amenable to intervention with stable angina.  TIA/CVA.  Hypertension.  Type 2 diabetes.  Dyslipidemia, well controlled.  Recommendations:  1. From cardiac standpoint her angina is stable, there is no evidence of ACS, she has  known small vessel disease, recommend continuing medical management with aspirin, Plavix, statin therapy.  2. Restart home dose of isosorbide, add low-dose carvedilol.  3. Check orthostasis, continue to monitor on telemetry.  4. Echocardiogram was ordered and will be reviewed.  5. Will consider 2-week cardiac monitor on discharge.  6. Evaluation and management of diabetes and other medical conditions per hospitalist service/neurology.  7. Thank you for this consultation, we will follow.    Scribed for Jeana Reyna MD by Electronically signed by BARBARA Hernandes, 07/13/20, 11:36 AM.    I, Jeana Reyna MD, personally performed the services described in this documentation as scribed by the above named individual in my presence, and it is both accurate and complete.  7/13/2020  16:11

## 2020-07-13 NOTE — PROGRESS NOTES
Discharge Planning Assessment  [facility]     Patient Name: Samantha Napoles  MRN: [id number]  Today's Date: 7/13/2020    Admit Date: 7/12/2020    Discharge Needs Assessment     Row Name 07/13/20 1157       Living Environment    Lives With  alone    Current Living Arrangements  home/apartment/condo    Primary Care Provided by  self    Provides Primary Care For  no one    Family Caregiver if Needed  child(earl), adult    Family Caregiver Names  Member has 2 sons that live close, one lives 2 miles away and 1 lives next door    Quality of Family Relationships  unable to assess    Able to Return to Prior Arrangements  yes    Living Arrangement Comments  CM spoke with pt with permission regarding discharge plan.  Pt resides in [facility] in a home a lone.  Pt reports she has 2 sons and one lives next door.        Transition Planning    Patient/Family Anticipates Transition to  home    Patient/Family Anticipated Services at Transition      Transportation Anticipated  family or friend will provide       Discharge Needs Assessment    Readmission Within the Last 30 Days  no previous admission in last 30 days    Concerns to be Addressed  discharge planning    Equipment Needed After Discharge  commode;walker, rolling;shower chair;glucometer;nebulizer    Outpatient/Agency/Support Group Needs  other (see comments) Pt has used HH in the past, but cant remember the name of HH agency.  Pt wants to see how she does before deciding if she needs HH at discharge.    Discharge Coordination/Progress  Pt reports she has Cued Medicare Replacement and Cued Medicaid insurance.  Pt reports she has prescription coverage and uses [facility].  Plan is home and reports she has family that lives next door and can help her. Pt undecided if she will need HH and she has used HH in the past, but cant remember HH agency.  She wants to see how she does.  CM will cont to follow.         Discharge Plan     Row Name 07/13/20 6127       Plan     Plan  discharge plan    Patient/Family in Agreement with Plan  yes    Plan Comments  Plan is home and reports she has family that lives next door and can help her. Pt undecided if she will need HH and she has used HH in the past, but cant remember HH agency.  She wants to see how she does.  CM will cont to follow.     Final Discharge Disposition Code  30 - still a patient        Destination      Coordination has not been started for this encounter.      Durable Medical Equipment      Coordination has not been started for this encounter.      Dialysis/Infusion      Coordination has not been started for this encounter.      Home Medical Care      Coordination has not been started for this encounter.      Therapy      Coordination has not been started for this encounter.      Community Resources      Coordination has not been started for this encounter.        Expected Discharge Date and Time     Expected Discharge Date Expected Discharge Time    Jul 15, 2020         Demographic Summary     Row Name 07/13/20 6584       General Information    General Information Comments  Pt's PCP NATALIE Gilmore        Contact Information    Permission Granted to Share Info With      Contact Information Obtained for      Contact Information Comments  Nik Guajardo(son): [phone] or [phone]        Functional Status    No documentation.       Psychosocial    No documentation.       Abuse/Neglect    No documentation.       Legal    No documentation.       Substance Abuse    No documentation.       Patient Forms    No documentation.           Vivien Harvey, JOURDAN

## 2020-07-13 NOTE — CONSULTS
"Stroke Consult Note    Patient Name: Samantha Napoles   MRN: [id number]  Age: 80 y.o.  Sex: female  : 1940    Primary Care Physician: Marc Salcedo MD  Referring Physician:  No ref. provider found    TIME STROKE TEAM CALLED:  EST     TIME PATIENT SEEN:  EST    Handedness: Left   Race:     Chief Complaint/Reason for Consultation: Left Lower extremity weakness and numbness    Subjective .  HPI: Samantha Napoles is a 80 yr old female with a PMH significant for CVA, brain cancer (s/p craniotomy ), CAD with stent (Pt of Dr. Reyna on ASA/Plavix), HTN, HLD, T2DM, heart palpitations, COPD, vertigo, frequent falls, and osteoarthritis that was transferred to [facility] from [facility] with c/o chest pain and left lower extremity weakness/numbness that began at between 11am-12pm today. Per the ED physician, on arrival the patient stated her LLE weakness/numbness began to improve. He gave her an NIH of 0. CTH @ the OSH was negative for any acute findings but showed a front defect that patient states is related to the surgery she had d/t brain cancer. At the time of transfer, the physician stated her only deficits were tingling and numbness in her LLE.     Upon arrival to [facility], the patient is noted to have a minor left facial droop (residual from previous CVA), LUE drift (residual from previous CVA), LLE and RLE weakness, and new mild to moderate left sided sensory loss. She states she has been feeling generally weaker for 2-3 days and her LLE weakness became acutely worse today around noon. The patient denies any blurred or double vision but has intermittent vision changes, syncopal episodes, and dizziness. She states that she \"passed out\" in her chair Friday night and did not wake until the next morning. She c/o chest pain intermittently r/t a blocked artery in her heart that cannot be stented. She had chest heaviness/pressure and palpitations this morning while sitting in her chair that was relieved by " "nitroglycerin.Of note, the patient is a poor historian, changing symptoms and time of onset several times throughout the assessment. NIH 6.       Last Known Normal Date/Time: Unknown     Review of Systems   Constitutional: Positive for fatigue.   Eyes: Positive for visual disturbance.   Cardiovascular: Positive for chest pain.   Gastrointestinal:        Left sided tenderness   Musculoskeletal: Positive for gait problem.   Neurological: Positive for dizziness, weakness, numbness and headaches.   All other systems reviewed and are negative.     No past medical history on file.  Past Surgical History:   Procedure Laterality Date   • BRAIN SURGERY      \"brain tumor surgery\"; incomplete database   • CHOLECYSTECTOMY       Family History   Problem Relation Age of Onset   • Stroke Mother    • Heart disease Father      Social History     Socioeconomic History   • Marital status:      Spouse name: Not on file   • Number of children: Not on file   • Years of education: Not on file   • Highest education level: Not on file   Tobacco Use   • Smoking status: Former Smoker     Last attempt to quit: 1985     Years since quittin.9   • Smokeless tobacco: Never Used   Substance and Sexual Activity   • Alcohol use: No   • Drug use: No   • Sexual activity: Defer     Allergies   Allergen Reactions   • Iodine Solution [Povidone Iodine]      P.O. Iodine - no allergy to IV contrast   • Penicillins    • Phenergan [Promethazine]      Prior to Admission medications    Medication Sig Start Date End Date Taking? Authorizing Provider   clopidogrel (PLAVIX) 75 MG tablet Take 75 mg by mouth daily.    iLllie Branch MD   donepezil (ARICEPT) 10 MG tablet Take 10 mg by mouth every night.    Lillie Branch MD   enalapril (VASOTEC) 10 MG tablet Take 10 mg by mouth 2 (two) times a day.    Lillie Branch MD   fexofenadine (ALLEGRA) 180 MG tablet Take 180 mg by mouth daily.    ProviderLillie MD   gabapentin " (NEURONTIN) 600 MG tablet Take 600 mg by mouth 2 (two) times a day.    Lillie Branch MD   isosorbide mononitrate (IMDUR) 60 MG 24 hr tablet Take 60 mg by mouth daily.    Lillie Branch MD   metFORMIN (GLUCOPHAGE) 500 MG tablet Take 500 mg by mouth 2 (two) times a day with meals.    Lillie Branch MD   montelukast (SINGULAIR) 10 MG tablet Take 10 mg by mouth every night.    Lillie Branch MD   pantoprazole (PROTONIX) 40 MG EC tablet Take 40 mg by mouth daily.    Lillie Branch MD   rosuvastatin (CRESTOR) 20 MG tablet Take 20 mg by mouth daily.    Lillie Branch MD   traMADol (ULTRAM) 50 MG tablet Take 50 mg by mouth every 6 (six) hours as needed for moderate pain (4-6).    Lillie Branch MD             Objective        Neurological Exam  Mental Status  Awake and alert. Oriented to person, place and time. Speech is normal. Language is fluent with no aphasia.    Cranial Nerves  CN II: Visual fields full to confrontation.  CN III, IV, VI: Extraocular movements intact bilaterally. Extraocular movements intact bilaterally. Normal lids and orbits bilaterally. Pupils equal round and reactive to light bilaterally.  CN V:  Left: Diminished sensation of the entire left side of the face.  CN VII:  Left: Minor left facial droop.  CN VIII: Hearing is normal.  CN IX, X: Palate elevates symmetrically  CN XI: Shoulder shrug strength is normal.  CN XII: Tongue midline without atrophy or fasciculations.    Motor    Mild LUE drift with tremor. LLE with some effort against gravity.  right >left. Strength 5/5 in RUE, RLE. 4/5 in LUE. 3/5 in LLE..    Sensory  Mild sensory loss to light touch on the left side. .    Coordination  Right: Finger-to-nose normal.  Left: Finger-to-nose normal.    Gait  Did not assess.      Physical Exam   Constitutional: She is oriented to person, place, and time. She appears well-developed and well-nourished.   HENT:   Head: Normocephalic and atraumatic.    Eyes: Pupils are equal, round, and reactive to light. EOM and lids are normal.   Neck: Normal range of motion. Neck supple.   Cardiovascular: Normal rate.   Frequent PACs and PVCs on the monitor   Pulmonary/Chest: Effort normal and breath sounds normal.   Abdominal: Soft. Bowel sounds are normal. There is tenderness.   Musculoskeletal:   As above.    Neurological: She is alert and oriented to person, place, and time.   As above.    Skin: Skin is warm and dry.   Psychiatric: She has a normal mood and affect. Her speech is normal.       Acute Stroke Data    Alteplase (tPA) Inclusion / Exclusion Criteria    Time: 2315  Person Administering Scale: MARVIN Jasso    Inclusion Criteria  [x]   18 years of age or greater   []   Onset of symptoms < 4.5 hours before beginning treatment (stroke onset = time patient was last seen well or without symptoms).   []   Diagnosis of acute ischemic stroke causing measurable disabling deficit (Complete Hemianopia, Any Aphasia, Visual or Sensory Extinction, Any weakness limiting sustained effort against gravity)   []   Any remaining deficit considered potentially disabling in view of patient and practitioner   Exclusion criteria (Do not proceed with Alteplase if any are checked under exclusion criteria)  [x]   Onset unknown or GREATER than 4.5 hours   []   ICH on CT/MRI   []   CT demonstrates hypodensity representing acute or subacute infarct   []   Significant head trauma or prior stroke in the previous 3 months   []   Symptoms suggestive of subarachnoid hemorrhage   []   History of un-ruptured intracranial aneurysm GREATER than 10 mm   []   Recent intracranial or intraspinal surgery within the last 3 months   []   Arterial puncture at a non-compressible site in the previous 7 days   []   Active internal bleeding   []   Acute bleeding tendency   []   Platelet count LESS than 100,000 for known hematological diseases such as leukemia, thrombocytopenia or chronic cirrhosis    []   Current use of anticoagulant with INR GREATER than 1.7 or PT GREATER than 15 seconds, aPTT GREATER than 40 seconds   []   Heparin received within 48 hours, resulting in abnormally elevated aPTT GREATER than upper limit of normal   []   Current use of direct thrombin inhibitors or direct factor Xa inhibitors in the past 48 hours   []   Elevated blood pressure refractory to treatment (systolic GREATER than 185 mm/Hg or diastolic  GREATER than 110 mm/Hg   []   Suspected infective endocarditis and aortic arch dissection   []   Current use of therapeutic treatment dose of low-molecular-weight heparin (LMWH) within the previous 24 hours   []   Structural GI malignancy or bleed   Relative exclusion for all patients  []   Only minor non-disabling symptoms   []   Pregnancy   []   Seizure at onset with postictal residual neurological impairments   []   Major surgery or previous trauma within past 14 days   []   History of previous spontaneous ICH, intracranial neoplasm, or AV malformation   []   Postpartum (within previous 14 days)   []   Recent GI or urinary tract hemorrhage (within previous 21 days)   []   Recent acute MI (within previous 3 months)   []   History of un-ruptured intracranial aneurysm LESS than 10 mm   []   History of ruptured intracranial aneurysm   []   Blood glucose LESS than 50 mg/dL (2.7 mmol/L)   []   Dural puncture within the last 7 days   []   Known GREATER than 10 cerebral microbleeds   Additional exclusions for patients with symptoms onset between 3 and 4.5 hours.  []   Age > 80.   []   On any anticoagulants regardless of INR  >>> Warfarin (Coumadin), Heparin, Enoxaparin (Lovenox), fondaparinux (Arixtra), bivalirudin (Angiomax), Argatroban, dabigatran (Pradaxa), rivaroxaban (Xarelto), or apixaban (Eliquis)   []   Severe stroke (NIHSS > 25).   []   History of BOTH diabetes and previous ischemic stroke.   []   The risks and benefits have been discussed with the patient or family related to the  administration of IV Alteplase for stroke symptoms.   []   I have discussed and reviewed the patient's case and imaging with the attending prior to IV Alteplase.    Time Alteplase administered       Hospital Meds:  Scheduled-   [START ON 2020] aspirin 81 mg Oral Daily   Or      [START ON 2020] aspirin 300 mg Rectal Daily   atorvastatin 80 mg Oral Nightly   [START ON 2020] cetirizine 5 mg Oral Daily   [START ON 2020] clopidogrel 75 mg Oral Daily   [START ON 2020] donepezil 10 mg Oral Nightly   [START ON 2020] gabapentin 600 mg Oral Q12H   [START ON 2020] insulin lispro 0-7 Units Subcutaneous TID AC   [START ON 2020] montelukast 10 mg Oral Nightly   [START ON 2020] pantoprazole 40 mg Oral Q AM   sodium chloride 10 mL Intravenous Q12H   [START ON 2020] sodium chloride 10 mL Intravenous Q12H     Infusions-     PRNs- •  acetaminophen **OR** acetaminophen **OR** acetaminophen  •  dextrose  •  dextrose  •  glucagon (human recombinant)  •  sodium chloride  •  sodium chloride    Functional Status Prior to Current Stroke/[place] Score: 1    NIH Stroke Scale  Time: 23:15  Person Administering Scale: MARVIN Jasso    1a  Level of consciousness: 0=alert; keenly responsive   1b. LOC questions:  0=Performs both tasks correctly   1c. LOC commands: 0=Performs both tasks correctly   2.  Best Gaze: 0=normal   3.  Visual: 0=No visual loss   4. Facial Palsy: 1=Minor paralysis (flattened nasolabial fold, asymmetric on smiling)   5a.  Motor left arm: 1=Drift, limb holds 90 (or 45) degrees but drifts down before full 10 seconds: does not hit bed   5b.  Motor right arm: 0=No drift, limb holds 90 (or 45) degrees for full 10 seconds   6a. motor left le=Some effort against gravity, limb cannot get to or maintain (if cured) 90 (or 45) degrees, drifts down to bed, but has some effort against gravity   6b  Motor right le=Drift, limb holds 90 (or 45) degrees but drifts down  before full 10 seconds: does not hit bed   7. Limb Ataxia: 0=Absent   8.  Sensory: 1=Mild to moderate sensory loss; patient feels pinprick is less sharp or is dull on the affected side; there is a loss of superficial pain with pinprick but patient is aware She is being touched   9. Best Language:  0=No aphasia, normal   10. Dysarthria: 0=Normal   11. Extinction and Inattention: 0=No abnormality    Total:   6       Results Reviewed:  I have personally reviewed current lab, radiology, and data and agree with results.    Ct Cerebral Perfusion With & Without Contrast    Result Date: 7/13/2020  Normal brain perfusion CT. Signer Name: Jose Angel Hilliard MD  Signed: 7/13/2020 12:24 AM  Workstation Name: [id number]  [facility]             Assessment/Plan:      Samantha Napoles is a 80 yr old female with a PMH significant for CVA, brain cancer (s/p craniotomy 1963), CAD with stent (Pt of Dr. Reyna on ASA/Plavix), HTN, HLD, T2DM, heart palpitations, COPD, vertigo, frequent falls, and osteoarthritis that was transferred to [facility] from [facility] with c/o chest pain and left lower extremity weakness/numbness.     Possible stroke/TIA  -TIA/Ischemic stroke without thrombolytic therapy order set  -CTP/CTA  -MRI with/without; hx of brain tumor   -Echo  -HLD; Lipid panel in AM  -Continue DAPT with ASA/Plavix  -T2DM; HgbA1c in am; SSI per hospital medicine  -Permissive HTN until MRI results  -Fall precautions  -PT/OT/SLP  -Dementia; continue home Aricept  -NPO until swallow eval completed then healthy heart consistent carb diet  -Chest pain; Patient of Dr. Reyna; EKG, trending troponin per hospital medicine      MARVIN Jasso, AGACNP-BC, Stroke Navigator  July 12, 2020  11:51 PM

## 2020-07-13 NOTE — PLAN OF CARE
"  Problem: Patient Care Overview  Goal: Plan of Care Review  Outcome: Ongoing (interventions implemented as appropriate)  Flowsheets (Taken 7/13/2020 6233)  Plan of Care Reviewed With: patient  Note:   A&O, NIH \"2\" upon arrival to .  NSR with frequent PACs and occasional PVCs.  On RA.  Echo done at bedside.  Covid Lexar done and sent to lab. Continent and ambulated x1 with RW to BR.  Family at bedside in afternoon.         "

## 2020-07-13 NOTE — CONSULTS
" Discussed and taught patient about type 2 diabetes self-management, risk factors, and importance of blood glucose control to reduce complications. Target blood glucose readings and A1c goals per ADA were reviewed. Reviewed with patient current A1c 7.2 and discussed its significance. Signs, symptoms, and treatment of hyperglycemia and hypoglycemia were discussed. Lifestyle changes such as physical activity with MD approval and healthy eating were encouraged. Stressed the importance of strict blood sugar control after surgery to prevent complications such as infection and to promote healing of incision. Encouraged pt to monitor blood sugar at home 2-3  times per day and to call PCP if blood sugar is trending carmen. Encouraged to keep record of blood glucose readings to take to follow up appointment with PCP. Provided patient with copy of [employer]'s \"What is Diabetes\" handout, \"Blood Glucose Goals\" handout, and \"What is A1c\" handout. Thank you for this referal  "

## 2020-07-13 NOTE — PLAN OF CARE
Problem: Patient Care Overview  Goal: Plan of Care Review  Outcome: Ongoing (interventions implemented as appropriate)  Flowsheets (Taken 7/13/2020 1232)  Plan of Care Reviewed With: patient (Pended)  Outcome Summary: PT evaluation completed. Pt has residual L LE weakness, intermittent whole body posterior postural sway/jerks, and signs of a dropped 1st metatarsal head with minimal fat pad on her L LE. Pt demonstrates impulsive behaviors, so CGA<>MinAx2 was required for a STS. Pt ambulated 12' then 13' with a rolling walker and CGA<>MinAx2 due to postural instabilities. Pt would benefit from static and dynamic postural interventions. Discharge recommendation is IRF to enhance functional independence. (Pended)

## 2020-07-13 NOTE — H&P
[facility] Medicine Services  HISTORY AND PHYSICAL    Patient Name: Samantha Napoles  : 1940  MRN: [id number]  Primary Care Physician: Marc Salcedo MD    Subjective   Subjective     Chief Complaint:  Worsening left leg weakness, bilateral lower extremity numbness, chest pain     HPI:  Samantha Napoles is a 80 y.o. female with a past medical history significant for brain cancer, CAD, essential hypertension COPD, hyperlipidemia, arthritis, GERD, dementia, vertigo and diabetes mellitus type 2 presented initially to [facility] with complaints of worsening LLE weakness, bilateral lower extremity numbness and chest pain.  Patient is a poor historian therefore HPI is difficult to obtain.  Patient reports a prior history of a CVA with residual left sided weakness.  Over the past several days she notes worsening left lower extremity weakness along with BLE numbness.  She reports earlier today she was sitting in her chair when she had midsternal chest discomfort and took 1 sublingual NTG tablet with complete resolution of her chest discomfort.  She denies any known fever, chills, nausea, vomiting, abdominal pain, shortness of air or cough.  She does however report episodes of dizziness that is somewhat similar to her vertigo dizziness but at times different.  She denies any known syncope.  CT of head at outlying facility notes high right frontal vertex cranial defect.  Patient continues to have BLE numbness and left lower extremity weakness.  Patient will be admitted to [facility] under the care of the Hospitalist for further evaluation and treatment.     Review of Systems   Constitutional: Negative for activity change, appetite change, chills, diaphoresis, fatigue, fever and unexpected weight change.   Eyes: Positive for visual disturbance. Negative for photophobia.   Respiratory: Negative for cough and shortness of breath.    Cardiovascular: Positive for chest pain and palpitations. Negative for  "leg swelling.   Gastrointestinal: Negative for abdominal distention, abdominal pain, constipation, diarrhea, nausea and vomiting.   Genitourinary: Negative for difficulty urinating, dysuria, flank pain and hematuria.   Musculoskeletal: Positive for gait problem. Negative for neck pain and neck stiffness.   Skin: Negative.    Neurological: Positive for dizziness, weakness, numbness and headaches. Negative for seizures, syncope, facial asymmetry, speech difficulty and light-headedness.   Psychiatric/Behavioral: Negative.         Otherwise 10-system ROS reviewed and is negative except as mentioned in the HPI.    Personal History     No past medical history on file.    Past Surgical History:   Procedure Laterality Date   • BRAIN SURGERY  1963    \"brain tumor surgery\"; incomplete database   • CHOLECYSTECTOMY         Family History: family history includes Heart disease in her father; Stroke in her mother.     Social History:  reports that she quit smoking about 34 years ago. She has never used smokeless tobacco. She reports that she does not drink alcohol or use drugs.    Medications:  Medications Prior to Admission   Medication Sig Dispense Refill Last Dose   • clopidogrel (PLAVIX) 75 MG tablet Take 75 mg by mouth daily.   Taking   • donepezil (ARICEPT) 10 MG tablet Take 10 mg by mouth every night.   Taking   • enalapril (VASOTEC) 10 MG tablet Take 10 mg by mouth 2 (two) times a day.   Taking   • fexofenadine (ALLEGRA) 180 MG tablet Take 180 mg by mouth daily.   Taking   • gabapentin (NEURONTIN) 600 MG tablet Take 600 mg by mouth 2 (two) times a day.   Taking   • isosorbide mononitrate (IMDUR) 60 MG 24 hr tablet Take 60 mg by mouth daily.   Taking   • metFORMIN (GLUCOPHAGE) 500 MG tablet Take 500 mg by mouth 2 (two) times a day with meals.   Taking   • montelukast (SINGULAIR) 10 MG tablet Take 10 mg by mouth every night.   Taking   • pantoprazole (PROTONIX) 40 MG EC tablet Take 40 mg by mouth daily.   Taking   • " rosuvastatin (CRESTOR) 20 MG tablet Take 20 mg by mouth daily.   Taking   • traMADol (ULTRAM) 50 MG tablet Take 50 mg by mouth every 6 (six) hours as needed for moderate pain (4-6).   Taking       Allergies   Allergen Reactions   • Iodine Solution [Povidone Iodine]      P.O. Iodine - no allergy to IV contrast   • Penicillins    • Phenergan [Promethazine]        Objective   Objective     Vital Signs:       Total (NIH Stroke Scale): 7    Physical Exam   Constitutional: She is oriented to person, place, and time. She appears well-developed and well-nourished. No distress.   Alert and oriented x4   HENT:   Head: Normocephalic and atraumatic.   Eyes: Pupils are equal, round, and reactive to light. Conjunctivae and EOM are normal. Right eye exhibits no discharge. Left eye exhibits no discharge. No scleral icterus.   Neck: Normal range of motion. Neck supple. No JVD present. No tracheal deviation present. No thyromegaly present.   Cardiovascular: Normal rate, regular rhythm, normal heart sounds and intact distal pulses. Exam reveals no gallop and no friction rub.   No murmur heard.  Frequent PAC's and PVC's    Pulmonary/Chest: Effort normal and breath sounds normal. No stridor. No respiratory distress. She has no wheezes. She has no rales. She exhibits no tenderness.   Abdominal: Soft. Bowel sounds are normal. She exhibits no distension and no mass. There is tenderness. There is no rebound and no guarding. No hernia.   Epigastric tenderness with palpation.    Musculoskeletal: She exhibits no edema, tenderness or deformity.   Minimal ROM to left lower extremity    Full ROM to RLE    Lymphadenopathy:     She has no cervical adenopathy.   Neurological: She is alert and oriented to person, place, and time.   Speech clear, no pronator drift, no facial droop.   R>L, decreased sensation to left upper and lower extremity compared to right.     Skin: Skin is warm and dry. No rash noted. She is not diaphoretic. No erythema. No  pallor.   Psychiatric: She has a normal mood and affect. Her behavior is normal. Judgment and thought content normal.   Nursing note and vitals reviewed.       Results Reviewed:  I have personally reviewed current lab, radiology, and data and agree.              Invalid input(s):  ALKPHOS, TROPONININT  No results found for: BNP  No results found for: PHART, PCO2  Imaging Results (Last 24 Hours)     ** No results found for the last 24 hours. **             Assessment/Plan   Assessment / Plan     Active Hospital Problems    Diagnosis   • **TIA (transient ischemic attack)   • History of CVA (cerebrovascular accident)   • History of brain cancer   • CAD (coronary artery disease)     a. Cardiac catheterization followed by stent of right coronary artery.  b. Most recent cardiac catheterization, April 2005 by Dr. Reyna, showed widely patent stent of the right coronary artery, 95% stenosis in very distal/apical segment of LAD and a small caliber vessel (not amenable to catheter-based intervention),ejection fraction was 60%.  c. Echocardiogram, June 2007, showed normal LV function, moderate LVH, ejection fraction of 65% to 70%, diastolic dysfunction.  d. Cardiolite stress test, June 2007, was negative for ischemia and showed ejection fraction of 72%.  e. Recurrent angina.  Abnormal MPS, 04/08/2013, with apical ischemia, EF 80%.  f. Cardiac catheterization, 04/29/2013, showed nonobstructive disease of the major vessels without hemodynamically significant disease, small caliber distal/apical LAD with a 60% to 70% which was felt to be the etiology of apical ischemia noted on the stress test.  Ejection fraction was 65%.  This lesion was not amenable to intervention, and medical therapy was recommended.       • Degenerative arthritis   • Dyslipidemia   • GERD (gastroesophageal reflux disease)   • HTN (hypertension)         Assessment & Plan:  80 year old female transferred from [facility] for worsening left sided weakness  and bilateral lower extremity numbness.     1) TIA vs CVA  [facility]  -CTA of head/neck, CT perfusion pending  -MRI of brain with and without tonight due to prior history of brain tumor  -bubble echo in am  -check FLP, Hgb A1c  -ASA now and daily  -continue plavix  -high dose statin   -allow for permissive hypertension pending MRI  -NIH at OSH: 0, current NIH pending  -NPO until passes bedside dysphagia screen    2) Chest pain   -reports took 1 SL nitroglycerin tablet at home and no chest pain since  -troponin pending, trend  -CXR from [facility] facility negative  -FLP, Hgb A1c in am  -EKG now  -patient follows with Dr. Reyna   -prn nitro    3) CAD  -history noted above  -on DAPT  -last cardiac cath in 2013 showed non-obstructive disease of the major vessels without hemodynamically significant disease.  Small caliber distal/apical LAD with a 60% to 70% which was felt to be the etiology of apical ischemia noted on stress test. EF 65%.  Lesion was not amenable to intervention and medical therapy was recommended.   -follows with Dr. Reyna    4) Diabetes mellitus type 2  -check hgb A1c  -start ldssi   -fingersticks achs    5) hyperlipidemia  -on crestor   -FLP in am    6) Dementia  -on Aricept    7) Essential hypertension  -on vasotect, hold for now until further imaging  -allow for permissive hypertension       DVT prophylaxis:scds    CODE STATUS:  CPR, DNI  Code Status and Medical Interventions:   Ordered at: 07/12/20 6203     Level Of Support Discussed With:    Patient     Code Status:    CPR     Medical Interventions (Level of Support Prior to Arrest):    Full       Admission Status:  I believe this patient meets OBSERVATION status, however if further evaluation or treatment plans warrant, status may change.  Based upon current information, I predict patient's care encounter to be less than or equal to 2 midnights.    MARVIN Goncalves   07/12/20   23:31    Attending    Admission Attestation       I have seen and examined the patient, performing an independent face-to-face diagnostic evaluation with plan of care reviewed and developed with the advanced practice clinician (APC).      Brief Summary Statement:   Samantha Napoles is a 80 y.o. female With a PMH significant for remote history of brain cancer, CAD, HTN, COPD, HLD, GERD, vertigo, dementia, diabetes mellitus type 2 who presents to outside ED due to complaints of worsening weakness in her left lower extremity.  Patient states that she has had increased weakness in her left lower extremity over the past several days.  Today her left leg became acutely more numb.  She also reports weakness extending into her right leg as well.  She had an episode of chest pressure earlier today which was relieved with sublingual nitroglycerin.  Remainder of detailed HPI is as noted by APC and has been reviewed and/or edited by me for completeness.    Attending Physical Exam:  Constitutional: Awake, alert  Eyes: PERRLA, sclerae anicteric, no conjunctival injection  HENT: NCAT, mucous membranes moist  Neck: Supple, no thyromegaly, no lymphadenopathy, trachea midline  Respiratory: Clear to auscultation bilaterally, nonlabored respirations   Cardiovascular: RRR, no murmurs, rubs, or gallops, palpable pedal pulses bilaterally  Gastrointestinal: Positive bowel sounds, soft, nontender, nondistended  Musculoskeletal: No bilateral ankle edema, no clubbing or cyanosis to extremities  Psychiatric: Appropriate affect, cooperative  Neurologic: Oriented x 3, strength diminished in the left upper and left lower extremities compared to the right, Cranial Nerves grossly intact to confrontation, speech clear  Skin: No rashes      Brief Assessment/Plan :  See detailed assessment and plan developed with APC which I have reviewed and/or edited for completeness.    Electronically signed by Emma Carranza DO, 07/12/20, 11:59 PM.

## 2020-07-13 NOTE — PLAN OF CARE
Problem: Patient Care Overview  Goal: Plan of Care Review  Flowsheets (Taken 7/13/2020 1002)  Progress: no change  Outcome Summary: OT evaluation complete. Pt presents with L-sided weakness, fatigue, and decreased insight into fxl deficits impacting ADL performance. Pt ambulated to/from bathroom using RW with CGA and vc's for safety, completing STS/stand pivot transfer on/off toilet with CGA. Pt completed grooming standing at sink with FLEMING, limited by weakness in L hand and standing balance. Pt required Max A for LB dressing. Pt will benefit from skilled OT services to increase independence with self-care. Recommend IRF at discharge.

## 2020-07-14 VITALS
HEIGHT: 67 IN | RESPIRATION RATE: 16 BRPM | DIASTOLIC BLOOD PRESSURE: 85 MMHG | TEMPERATURE: 97.6 F | HEART RATE: 89 BPM | OXYGEN SATURATION: 96 % | SYSTOLIC BLOOD PRESSURE: 117 MMHG | WEIGHT: 163 LBS | BODY MASS INDEX: 25.58 KG/M2

## 2020-07-14 PROBLEM — I35.8 AORTIC VALVE SCLEROSIS: Status: ACTIVE | Noted: 2020-07-14

## 2020-07-14 PROBLEM — I51.89 GRADE I DIASTOLIC DYSFUNCTION: Status: ACTIVE | Noted: 2020-07-14

## 2020-07-14 LAB
BH CV ECHO MEAS - AO MAX PG (FULL): 13.9 MMHG
BH CV ECHO MEAS - AO MAX PG: 17.2 MMHG
BH CV ECHO MEAS - AO MEAN PG (FULL): 7.1 MMHG
BH CV ECHO MEAS - AO MEAN PG: 9 MMHG
BH CV ECHO MEAS - AO ROOT AREA (BSA CORRECTED): 1.6
BH CV ECHO MEAS - AO ROOT AREA: 7.1 CM^2
BH CV ECHO MEAS - AO ROOT DIAM: 3 CM
BH CV ECHO MEAS - AO V2 MAX: 207.6 CM/SEC
BH CV ECHO MEAS - AO V2 MEAN: 139.7 CM/SEC
BH CV ECHO MEAS - AO V2 VTI: 41.3 CM
BH CV ECHO MEAS - ASC AORTA: 2.7 CM
BH CV ECHO MEAS - AVA(I,A): 1.8 CM^2
BH CV ECHO MEAS - AVA(I,D): 1.8 CM^2
BH CV ECHO MEAS - AVA(V,A): 1.6 CM^2
BH CV ECHO MEAS - AVA(V,D): 1.6 CM^2
BH CV ECHO MEAS - BSA(HAYCOCK): 1.9 M^2
BH CV ECHO MEAS - BSA: 1.8 M^2
BH CV ECHO MEAS - BZI_BMI: 26.3 KILOGRAMS/M^2
BH CV ECHO MEAS - BZI_METRIC_HEIGHT: 167.6 CM
BH CV ECHO MEAS - BZI_METRIC_WEIGHT: 73.9 KG
BH CV ECHO MEAS - EDV(CUBED): 83.8 ML
BH CV ECHO MEAS - EDV(MOD-SP2): 94 ML
BH CV ECHO MEAS - EDV(MOD-SP4): 135 ML
BH CV ECHO MEAS - EDV(TEICH): 86.6 ML
BH CV ECHO MEAS - EF(CUBED): 87.8 %
BH CV ECHO MEAS - EF(MOD-SP2): 76.6 %
BH CV ECHO MEAS - EF(MOD-SP4): 73.3 %
BH CV ECHO MEAS - EF(TEICH): 82 %
BH CV ECHO MEAS - EF{MOD-BP}: 76 %
BH CV ECHO MEAS - ESV(CUBED): 10.2 ML
BH CV ECHO MEAS - ESV(MOD-SP2): 22 ML
BH CV ECHO MEAS - ESV(MOD-SP4): 36 ML
BH CV ECHO MEAS - ESV(TEICH): 15.6 ML
BH CV ECHO MEAS - FS: 50.4 %
BH CV ECHO MEAS - IVS/LVPW: 0.93
BH CV ECHO MEAS - IVSD: 1.2 CM
BH CV ECHO MEAS - LA/AO: 1.3
BH CV ECHO MEAS - LAD MAJOR: 6.8 CM
BH CV ECHO MEAS - LAT PEAK E' VEL: 5.8 CM/SEC
BH CV ECHO MEAS - LATERAL E/E' RATIO: 18.4
BH CV ECHO MEAS - LV DIASTOLIC VOL/BSA (35-75): 73.6 ML/M^2
BH CV ECHO MEAS - LV MASS(C)D: 199.3 GRAMS
BH CV ECHO MEAS - LV MASS(C)DI: 108.7 GRAMS/M^2
BH CV ECHO MEAS - LV MAX PG: 3.4 MMHG
BH CV ECHO MEAS - LV MEAN PG: 1.9 MMHG
BH CV ECHO MEAS - LV SYSTOLIC VOL/BSA (12-30): 19.6 ML/M^2
BH CV ECHO MEAS - LV V1 MAX: 91.8 CM/SEC
BH CV ECHO MEAS - LV V1 MEAN: 62.2 CM/SEC
BH CV ECHO MEAS - LV V1 VTI: 21.5 CM
BH CV ECHO MEAS - LVIDD: 4.4 CM
BH CV ECHO MEAS - LVIDS: 2.2 CM
BH CV ECHO MEAS - LVLD AP2: 6.8 CM
BH CV ECHO MEAS - LVLD AP4: 7.5 CM
BH CV ECHO MEAS - LVLS AP2: 5.9 CM
BH CV ECHO MEAS - LVLS AP4: 5.9 CM
BH CV ECHO MEAS - LVOT AREA (M): 3.5 CM^2
BH CV ECHO MEAS - LVOT AREA: 3.5 CM^2
BH CV ECHO MEAS - LVOT DIAM: 2.1 CM
BH CV ECHO MEAS - LVPWD: 1.3 CM
BH CV ECHO MEAS - MED PEAK E' VEL: 3.9 CM/SEC
BH CV ECHO MEAS - MEDIAL E/E' RATIO: 26.8
BH CV ECHO MEAS - MV A MAX VEL: 122.9 CM/SEC
BH CV ECHO MEAS - MV DEC SLOPE: 272.2 CM/SEC^2
BH CV ECHO MEAS - MV E MAX VEL: 109.1 CM/SEC
BH CV ECHO MEAS - MV E/A: 0.89
BH CV ECHO MEAS - MV MAX PG: 6.6 MMHG
BH CV ECHO MEAS - MV MEAN PG: 2.3 MMHG
BH CV ECHO MEAS - MV P1/2T MAX VEL: 89.5 CM/SEC
BH CV ECHO MEAS - MV P1/2T: 96.3 MSEC
BH CV ECHO MEAS - MV V2 MAX: 128.6 CM/SEC
BH CV ECHO MEAS - MV V2 MEAN: 71.5 CM/SEC
BH CV ECHO MEAS - MV V2 VTI: 37 CM
BH CV ECHO MEAS - MVA P1/2T LCG: 2.5 CM^2
BH CV ECHO MEAS - MVA(P1/2T): 2.3 CM^2
BH CV ECHO MEAS - MVA(VTI): 2.1 CM^2
BH CV ECHO MEAS - PA ACC SLOPE: 990.3 CM/SEC^2
BH CV ECHO MEAS - PA ACC TIME: 0.11 SEC
BH CV ECHO MEAS - PA MAX PG: 5.1 MMHG
BH CV ECHO MEAS - PA PR(ACCEL): 29.9 MMHG
BH CV ECHO MEAS - PA V2 MAX: 112.9 CM/SEC
BH CV ECHO MEAS - RAP SYSTOLE: 3 MMHG
BH CV ECHO MEAS - RVSP: 38 MMHG
BH CV ECHO MEAS - SI(AO): 159.7 ML/M^2
BH CV ECHO MEAS - SI(CUBED): 40.1 ML/M^2
BH CV ECHO MEAS - SI(LVOT): 41.5 ML/M^2
BH CV ECHO MEAS - SI(MOD-SP2): 39.3 ML/M^2
BH CV ECHO MEAS - SI(MOD-SP4): 54 ML/M^2
BH CV ECHO MEAS - SI(TEICH): 38.7 ML/M^2
BH CV ECHO MEAS - SV(AO): 292.8 ML
BH CV ECHO MEAS - SV(CUBED): 73.6 ML
BH CV ECHO MEAS - SV(LVOT): 76.1 ML
BH CV ECHO MEAS - SV(MOD-SP2): 72 ML
BH CV ECHO MEAS - SV(MOD-SP4): 99 ML
BH CV ECHO MEAS - SV(TEICH): 71 ML
BH CV ECHO MEAS - TAPSE (>1.6): 1.9 CM2
BH CV ECHO MEAS - TR MAX PG: 35 MMHG
BH CV ECHO MEAS - TR MAX VEL: 295 CM/SEC
BH CV ECHO MEASUREMENTS AVERAGE E/E' RATIO: 22.49
BH CV VAS BP LEFT ARM: NORMAL MMHG
BH CV XLRA - RV BASE: 4.2 CM
BH CV XLRA - RV LENGTH: 6.5 CM
BH CV XLRA - RV MID: 2.9 CM
BH CV XLRA - TDI S': 13.9 CM/SEC
GLUCOSE BLDC GLUCOMTR-MCNC: 156 MG/DL (ref 70–130)
GLUCOSE BLDC GLUCOMTR-MCNC: 190 MG/DL (ref 70–130)
LEFT ATRIUM VOLUME INDEX: 53.4 ML/M^2
LEFT ATRIUM VOLUME: 98 ML
LV EF 2D ECHO EST: 65 %
POTASSIUM SERPL-SCNC: 4.6 MMOL/L (ref 3.5–5.2)
REF LAB TEST METHOD: NORMAL
SARS-COV-2 RNA RESP QL NAA+PROBE: NOT DETECTED

## 2020-07-14 PROCEDURE — 25010000002 HEPARIN (PORCINE) PER 1000 UNITS: Performed by: INTERNAL MEDICINE

## 2020-07-14 PROCEDURE — 92523 SPEECH SOUND LANG COMPREHEN: CPT

## 2020-07-14 PROCEDURE — G0378 HOSPITAL OBSERVATION PER HR: HCPCS

## 2020-07-14 PROCEDURE — 63710000001 INSULIN LISPRO (HUMAN) PER 5 UNITS: Performed by: NURSE PRACTITIONER

## 2020-07-14 PROCEDURE — 97530 THERAPEUTIC ACTIVITIES: CPT

## 2020-07-14 PROCEDURE — 82962 GLUCOSE BLOOD TEST: CPT

## 2020-07-14 PROCEDURE — 99214 OFFICE O/P EST MOD 30 MIN: CPT | Performed by: INTERNAL MEDICINE

## 2020-07-14 PROCEDURE — 96372 THER/PROPH/DIAG INJ SC/IM: CPT

## 2020-07-14 PROCEDURE — 84132 ASSAY OF SERUM POTASSIUM: CPT | Performed by: INTERNAL MEDICINE

## 2020-07-14 PROCEDURE — 99217 PR OBSERVATION CARE DISCHARGE MANAGEMENT: CPT | Performed by: HOSPITALIST

## 2020-07-14 PROCEDURE — 97116 GAIT TRAINING THERAPY: CPT

## 2020-07-14 RX ORDER — DIVALPROEX SODIUM 500 MG/1
500 TABLET, DELAYED RELEASE ORAL EVERY 12 HOURS SCHEDULED
Qty: 60 TABLET | Refills: 0 | Status: SHIPPED | OUTPATIENT
Start: 2020-07-14 | End: 2020-08-13

## 2020-07-14 RX ORDER — ASPIRIN 81 MG/1
81 TABLET, CHEWABLE ORAL DAILY
Qty: 30 TABLET | Refills: 0 | Status: SHIPPED | OUTPATIENT
Start: 2020-07-15 | End: 2020-07-14

## 2020-07-14 RX ORDER — CARVEDILOL 3.12 MG/1
3.12 TABLET ORAL 2 TIMES DAILY WITH MEALS
Qty: 60 TABLET | Refills: 0 | Status: SHIPPED | OUTPATIENT
Start: 2020-07-14 | End: 2020-08-13

## 2020-07-14 RX ORDER — DIVALPROEX SODIUM 500 MG/1
500 TABLET, DELAYED RELEASE ORAL EVERY 12 HOURS SCHEDULED
Qty: 60 TABLET | Refills: 0 | Status: SHIPPED | OUTPATIENT
Start: 2020-07-14 | End: 2020-07-14

## 2020-07-14 RX ORDER — CARVEDILOL 3.12 MG/1
3.12 TABLET ORAL 2 TIMES DAILY WITH MEALS
Qty: 60 TABLET | Refills: 0 | Status: SHIPPED | OUTPATIENT
Start: 2020-07-14 | End: 2020-07-14

## 2020-07-14 RX ORDER — ASPIRIN 81 MG/1
81 TABLET, CHEWABLE ORAL DAILY
Qty: 30 TABLET | Refills: 0 | Status: SHIPPED | OUTPATIENT
Start: 2020-07-15 | End: 2020-08-14

## 2020-07-14 RX ADMIN — CARVEDILOL 3.12 MG: 3.12 TABLET, FILM COATED ORAL at 09:47

## 2020-07-14 RX ADMIN — INSULIN LISPRO 2 UNITS: 100 INJECTION, SOLUTION INTRAVENOUS; SUBCUTANEOUS at 09:46

## 2020-07-14 RX ADMIN — ISOSORBIDE MONONITRATE 60 MG: 60 TABLET, EXTENDED RELEASE ORAL at 09:47

## 2020-07-14 RX ADMIN — CETIRIZINE HYDROCHLORIDE 5 MG: 10 TABLET, FILM COATED ORAL at 09:47

## 2020-07-14 RX ADMIN — ASPIRIN 81 MG: 81 TABLET, CHEWABLE ORAL at 09:47

## 2020-07-14 RX ADMIN — CLOPIDOGREL BISULFATE 75 MG: 75 TABLET ORAL at 09:46

## 2020-07-14 RX ADMIN — PANTOPRAZOLE SODIUM 40 MG: 40 TABLET, DELAYED RELEASE ORAL at 05:39

## 2020-07-14 RX ADMIN — SODIUM CHLORIDE, PRESERVATIVE FREE 10 ML: 5 INJECTION INTRAVENOUS at 09:47

## 2020-07-14 RX ADMIN — HEPARIN SODIUM 5000 UNITS: 5000 INJECTION INTRAVENOUS; SUBCUTANEOUS at 05:40

## 2020-07-14 RX ADMIN — GABAPENTIN 600 MG: 300 CAPSULE ORAL at 05:39

## 2020-07-14 RX ADMIN — ENALAPRIL MALEATE 10 MG: 5 TABLET ORAL at 09:47

## 2020-07-14 RX ADMIN — INSULIN LISPRO 2 UNITS: 100 INJECTION, SOLUTION INTRAVENOUS; SUBCUTANEOUS at 12:26

## 2020-07-14 RX ADMIN — DIVALPROEX SODIUM 500 MG: 500 TABLET, DELAYED RELEASE ORAL at 09:47

## 2020-07-14 NOTE — PLAN OF CARE
Problem: Patient Care Overview  Goal: Plan of Care Review  Outcome: Ongoing (interventions implemented as appropriate)  Flowsheets (Taken 7/14/2020 1323)  Plan of Care Reviewed With: patient  Note:   SLP evaluation completed. Will address cognitive-communication impairments in tx. Please see note for further details and recommendations.

## 2020-07-14 NOTE — PROGRESS NOTES
Continued Stay Note  [facility]     Patient Name: Samantha Napoles  MRN: [id number]  Today's Date: 7/14/2020    Admit Date: 7/12/2020    Discharge Plan     Row Name 07/14/20 1136       Plan    Plan  Home with family    Patient/Family in Agreement with Plan  yes    Plan Comments  Spoke with patient at bedside. Plan is home with family. Patient has all need DME and denies any need for home health. CM will continue to follow.    Final Discharge Disposition Code  01 - home or self-care        Discharge Codes    No documentation.       Expected Discharge Date and Time     Expected Discharge Date Expected Discharge Time    Jul 15, 2020             Huey Diana RN

## 2020-07-14 NOTE — PLAN OF CARE
Problem: Fall Risk (Adult)  Goal: Identify Related Risk Factors and Signs and Symptoms  Outcome: Ongoing (interventions implemented as appropriate)  Goal: Absence of Fall  Outcome: Ongoing (interventions implemented as appropriate)     Problem: Patient Care Overview  Goal: Plan of Care Review  Outcome: Ongoing (interventions implemented as appropriate)  Flowsheets  Taken 7/14/2020 0307  Progress: no change  Taken 7/13/2020 2020  Plan of Care Reviewed With: patient  Note:   Pt a+o x3, disoriented to time this evening, but is drowsy and reports lack of sleep. NIH=1, sensation mildly impaired. VSS, denies pain/ discomfort.  NSR w/ PACs and PVCs, RA. Potassium replacement given, see MAR, am draw pending.   Goal: Individualization and Mutuality  Outcome: Ongoing (interventions implemented as appropriate)  Goal: Discharge Needs Assessment  Outcome: Ongoing (interventions implemented as appropriate)  Goal: Interprofessional Rounds/Family Conf  Outcome: Ongoing (interventions implemented as appropriate)     Problem: Stroke (Ischemic) (Adult)  Goal: Signs and Symptoms of Listed Potential Problems Will be Absent, Minimized or Managed (Stroke)  Outcome: Ongoing (interventions implemented as appropriate)

## 2020-07-14 NOTE — DISCHARGE SUMMARY
[facility] Medicine Services  DISCHARGE SUMMARY    Patient Name: Samantha Napoles  : 1940  MRN: [id number]    Date of Admission: 2020 10:44 PM  Date of Discharge:  2020 (Tuesday)  Primary Care Physician: Marc Salcedo MD    Consults     Date and Time Order Name Status Description    2020 0709 Inpatient Cardiology Consult Completed     2020 0030 Inpatient Neurology Consult General Completed         Jeana Reyna MD - Cardiology  Naveentommy Hayes MD - Neurology    Hospital Course     Presenting Problem:   TIA (transient ischemic attack) [G45.9]  TIA (transient ischemic attack) [G45.9]    Active Hospital Problems    Diagnosis  POA   • **TIA (transient ischemic attack) [G45.9]  Yes   • Aortic valve sclerosis [I35.8]  Yes   • Grade I diastolic dysfunction [I51.9]  Yes   • Type 2 diabetes mellitus (CMS/HCC) [E11.9]  Yes   • Angina pectoris (CMS/HCC) [I20.9]  Yes   • Questionable Syncope [R55]  Yes   • History of CVA (cerebrovascular accident) [Z86.73]  Not Applicable   • History of brain cancer [Z85.841]  Not Applicable   • Coronary artery disease [I25.10]  Yes   • Degenerative arthritis [M19.90]  Yes   • Dyslipidemia [E78.5]  Yes   • GERD (gastroesophageal reflux disease) [K21.9]  Yes   • HTN (hypertension) [I10]  Yes      Resolved Hospital Problems   No resolved problems to display.          Hospital Course:  Samantha Napoles is a 80 y.o. female who presented to [facility] ED with weakness and dizziness for 2-3 weeks.  With her history of CVA and CAD it was felt she should be transferred here for Stroke work up and Dr. Brooks was called by OSH for transfer.  She was seen by Neurology on arrival and was having some angina. She has known CAD with stents and some level of CAD not treatable by intervention and has been medically managed. There was concern for possible TIA and Chest Pain.      She had a negative neuro work up and there were no acute finding found  for concerns about stroke.  An echo was done and she was seen by Cardiology and it was felt that she may benefit from maximum medical mgmt and a 2 week cardiac monitor which will be arranged by Dr. Reyna.    She was given Depakote twice per day due to concerns about headaches and this was given for headache prevention.     She will follow up with Cardiology in 4 weeks in the [facility] offices.      Discharge Follow Up Recommendations for outpatient labs/diagnostics:    Follow up in the Cardiology Offices in [facility] with Dr. Reyna in 4-6 weeks    Day of Discharge     HPI:   Wants to go home.  No chest pain reported    Review of Systems    Gen- No fevers, chills  CV- No chest pain, palpitations  Resp- No cough, dyspnea  GI- No N/V/D, abd pain    Vital Signs:   Temp:  [97.4 °F (36.3 °C)-97.9 °F (36.6 °C)] 97.6 °F (36.4 °C)  Heart Rate:  [55-89] 89  Resp:  [16-18] 16  BP: (116-144)/(64-85) 117/85     Physical Exam:    Constitutional: No acute distress, awake, alert  HENT: NCAT, mucous membranes moist  Respiratory: Clear to auscultation bilaterally, respiratory effort normal   Cardiovascular: RRR, s1 and s2  Gastrointestinal: Positive bowel sounds, soft, nontender, nondistended  Musculoskeletal: No bilateral ankle edema  Psychiatric: Appropriate affect, cooperative  Neurologic: Oriented x 3, left LE a little weaker than right LE  Skin: No rashes    Pertinent  and/or Most Recent Results     Results from last 7 days   Lab Units 07/14/20  0639 07/13/20  0336   WBC 10*3/mm3  --  7.48   HEMOGLOBIN g/dL  --  12.3   HEMATOCRIT %  --  37.9   PLATELETS 10*3/mm3  --  196   SODIUM mmol/L  --  143   POTASSIUM mmol/L 4.6 3.5   CHLORIDE mmol/L  --  104   CO2 mmol/L  --  24.0   BUN mg/dL  --  17   CREATININE mg/dL  --  0.77   GLUCOSE mg/dL  --  183*   CALCIUM mg/dL  --  9.1           Invalid input(s): PROT, LABALBU  Results from last 7 days   Lab Units 07/13/20  0336   CHOLESTEROL mg/dL 93   TRIGLYCERIDES mg/dL 153*   HDL CHOL  mg/dL 50     Results from last 7 days   Lab Units 07/13/20  0336   HEMOGLOBIN A1C % 7.20*   TROPONIN T ng/mL <0.010       Brief Urine Lab Results  (Last result in the past 365 days)      Color   Clarity   Blood   Leuk Est   Nitrite   Protein   CREAT   Urine HCG        07/13/20 0725 Yellow Clear Negative Negative Negative Negative               Microbiology Results Abnormal     Procedure Component Value - Date/Time    COVID PRE-OP / PRE-PROCEDURE SCREENING ORDER (NO ISOLATION) - Swab, Nasopharynx [[id number]] Collected:  07/13/20 1417    Lab Status:  Final result Specimen:  Swab from Nasopharynx Updated:  07/14/20 1427    Narrative:       The following orders were created for panel order COVID PRE-OP / PRE-PROCEDURE SCREENING ORDER (NO ISOLATION) - Swab, Nasopharynx.  Procedure                               Abnormality         Status                     ---------                               -----------         ------                     COVID-19,[facility], NP ...[[id number]]                      Final result                 Please view results for these tests on the individual orders.    COVID-19,[facility], NP SWAB IN LEXAR SALINE MEDIA 24-30 HR TAT - Swab, Nasopharynx [[id number]] Collected:  07/13/20 1417    Lab Status:  Final result Specimen:  Swab from Nasopharynx Updated:  07/14/20 1427     Reference Lab Report --     Comment: See scanned report          COVID19 Not Detected          Imaging Results (All)     Procedure Component Value Units Date/Time    XR Outside Films [[id number]] Resulted:  07/13/20 1626     Updated:  07/13/20 1626    Narrative:       This procedure was auto-finalized with no dictation required.    CT Outside Films [[id number]] Resulted:  07/13/20 1625     Updated:  07/13/20 1625    Narrative:       This procedure was auto-finalized with no dictation required.    MRI Brain With & Without Contrast [[id number]] Collected:  07/13/20 0128     Updated:  07/13/20 0130    Narrative:       MRI Brain WO  W    INDICATION:   Generalized weakness and dizziness for 2 weeks, worsening. Seizure activity. Blurred vision and acute headache.    TECHNIQUE:   MRI of the brain with and without IV contrast. 15 cc of MultiHance was administered.    COMPARISON:    10/20/2017    FINDINGS:  Diffusion images show no acute or subacute infarct. Ventricular size and configuration are within normal limits. There is generalized atrophy. Chronic small vessel ischemic changes are present in the white matter, slightly progressive from prior. No  acute or chronic hemorrhage is identified. Major intracranial flow voids are maintained. No masses or pathologic contrast enhancement are identified. Patient is status post craniotomy at the vertex. Bilateral hippocampal formations are symmetric.  Craniovertebral junction is normal.      Impression:         1. No acute findings in the brain.  2. Atrophy with slight progression of chronic small vessel ischemic disease in the white matter.  3. No masses or pathologic contrast enhancement are seen.    Signer Name: Jose Angel Hilliard MD   Signed: 7/13/2020 1:28 AM   Workstation Name: [facility]    [facility]    CT Angiogram Head [[id number]] Collected:  07/13/20 0049     Updated:  07/13/20 0051    Narrative:       CTA Head, CTA Neck    INDICATION:   Generalized weakness with dizziness for 2 weeks, worsening.    TECHNIQUE:   CT angiogram of the head and neck with and without IV contrast. 3-D reconstructions were obtained and reviewed.  Evaluation for a significant carotid arterial stenosis is based on the NASCET criteria. Radiation dose reduction techniques included  automated exposure control or exposure modulation based on body size. Count of known CT and cardiac nuc med studies performed in previous 12 months: 1.     COMPARISON:   CT head 2/3/2020    FINDINGS:   CTA neck:  Lung apices are clear. There is atherosclerotic disease in the arch and great vessels. No significant stenosis  is seen. There is fairly extensive plaque at the bifurcations, left greater than right. There are less than 50% stenoses in the  proximal ICAs. Vertebral arteries are codominant and widely patent. No carotid or vertebral arterial dissection.    CTA head:  There is plaque in both carotid siphons. No significant stenosis is seen. Vertebrobasilar system is normal. The anterior, middle, and posterior cerebral arteries appear widely patent. No flow-limiting stenosis or vessel cut off is identified.  Major dural venous sinuses are patent. Patient is status post craniotomy at the vertex. No definite aneurysm is seen.      Impression:         1. Plaque at both carotid bifurcations, but less than 50% stenosis in both ICAs.  2. No carotid or vertebral dissection.  3. Mild intracranial atherosclerotic disease at the carotid siphons. No intracranial flow-limiting stenosis or vessel cut off. No definite aneurysm.    Signer Name: Jose Angel Hilliard MD   Signed: 7/13/2020 12:49 AM   Workstation Name: [facility]    [facility]    CT Angiogram Neck [[id number]] Collected:  07/13/20 0049     Updated:  07/13/20 0051    Narrative:       CTA Head, CTA Neck    INDICATION:   Generalized weakness with dizziness for 2 weeks, worsening.    TECHNIQUE:   CT angiogram of the head and neck with and without IV contrast. 3-D reconstructions were obtained and reviewed.  Evaluation for a significant carotid arterial stenosis is based on the NASCET criteria. Radiation dose reduction techniques included  automated exposure control or exposure modulation based on body size. Count of known CT and cardiac nuc med studies performed in previous 12 months: 1.     COMPARISON:   CT head 2/3/2020    FINDINGS:   CTA neck:  Lung apices are clear. There is atherosclerotic disease in the arch and great vessels. No significant stenosis is seen. There is fairly extensive plaque at the bifurcations, left greater than right. There are less than 50%  stenoses in the  proximal ICAs. Vertebral arteries are codominant and widely patent. No carotid or vertebral arterial dissection.    CTA head:  There is plaque in both carotid siphons. No significant stenosis is seen. Vertebrobasilar system is normal. The anterior, middle, and posterior cerebral arteries appear widely patent. No flow-limiting stenosis or vessel cut off is identified.  Major dural venous sinuses are patent. Patient is status post craniotomy at the vertex. No definite aneurysm is seen.      Impression:         1. Plaque at both carotid bifurcations, but less than 50% stenosis in both ICAs.  2. No carotid or vertebral dissection.  3. Mild intracranial atherosclerotic disease at the carotid siphons. No intracranial flow-limiting stenosis or vessel cut off. No definite aneurysm.    Signer Name: Jose Angel Hilliard MD   Signed: 7/13/2020 12:49 AM   Workstation Name: [facility]    CT Cerebral Perfusion With & Without Contrast [[id number]] Collected:  07/13/20 0024     Updated:  07/13/20 0026    Narrative:       CT CEREBRAL PERFUSION W WO CONTRAST    HISTORY:   Generalized weakness and dizziness for 2 weeks, worsening.    TECHNIQUE:   Axial CT images of the brain without and with intravenous contrast using cerebral perfusion protocol. Post-processing parametric maps were created using RAPID software and reviewed. Radiation dose reduction techniques included automated exposure control  or exposure modulation based on body size. CT and nuclear cardiology exams in the last 12 months: 1.    COMPARISON:   CT head 2/3/2020    FINDINGS:   Arterial input function is optimal.     Perfusion maps are symmetric without evidence of cerebral ischemia or core infarction.      Impression:       Normal brain perfusion CT.          Signer Name: Jose Angel Hilliard MD   Signed: 7/13/2020 12:24 AM   Workstation Name: [facility]    [facility]                    Results for  orders placed during the hospital encounter of 07/12/20   Adult Transthoracic Echo Complete W/ Cont if Necessary Per Protocol (With Agitated Saline)    Narrative · Left ventricular wall thickness is consistent with mild concentric   hypertrophy.  · Left ventricular systolic function is normal. Estimated EF = 65%.  · Left ventricular diastolic dysfunction (grade I) consistent with   impaired relaxation.  · Left atrial cavity size is moderately dilated.  · Aortic sclerosis with mild aortic stenosis, MARIA TERESA 1.8 cm², mean gradient 9   mmHg.  · Trace mitral vegetation, mild tricuspid regurgitation, RVSP 38 mmHg.  · Calcified mitral valve apparatus.          Discharge Details        Discharge Medications      New Medications      Instructions Start Date   aspirin 81 MG chewable tablet  Replaces:  aspirin 325 MG tablet   81 mg, Oral, Daily   Start Date:  July 15, 2020     carvedilol 3.125 MG tablet  Commonly known as:  COREG   3.125 mg, Oral, 2 Times Daily With Meals      divalproex 500 MG DR tablet  Commonly known as:  DEPAKOTE   500 mg, Oral, Every 12 Hours Scheduled         Continue These Medications      Instructions Start Date   clopidogrel 75 MG tablet  Commonly known as:  PLAVIX   75 mg, Oral, Daily      donepezil 10 MG tablet  Commonly known as:  ARICEPT   10 mg, Oral, Nightly      enalapril 10 MG tablet  Commonly known as:  VASOTEC   10 mg, Oral, 2 Times Daily      gabapentin 600 MG tablet  Commonly known as:  NEURONTIN   600 mg, Oral, 2 Times Daily      isosorbide mononitrate 60 MG 24 hr tablet  Commonly known as:  IMDUR   60 mg, Oral, Daily      LORazepam 0.5 MG tablet  Commonly known as:  ATIVAN   0.5 mg, Oral, Every 8 Hours PRN      metFORMIN 500 MG tablet  Commonly known as:  GLUCOPHAGE   500 mg, Oral, 2 Times Daily With Meals      montelukast 10 MG tablet  Commonly known as:  SINGULAIR   10 mg, Oral, Nightly      pantoprazole 40 MG EC tablet  Commonly known as:  PROTONIX   40 mg, Oral, Daily      rosuvastatin  20 MG tablet  Commonly known as:  CRESTOR   20 mg, Oral, Daily         Stop These Medications    aspirin 325 MG tablet  Replaced by:  aspirin 81 MG chewable tablet     fexofenadine 180 MG tablet  Commonly known as:  ALLEGRA     traMADol 50 MG tablet  Commonly known as:  ULTRAM          Allergies   Allergen Reactions   • Iodine Solution [Povidone Iodine]      P.O. Iodine - no allergy to IV contrast   • Penicillins    • Phenergan [Promethazine]      Discharge Disposition:  Home or Self Care    Diet:  Hospital:  Diet Order   Procedures   • Diet Regular; Cardiac, Consistent Carbohydrate     Activity:  Activity Instructions     Please resume activity as tolerated.                  CODE STATUS:    Code Status and Medical Interventions:   Ordered at: 07/12/20 2349     Limited Support to NOT Include:    Intubation     Level Of Support Discussed With:    Patient     Code Status:    CPR     Medical Interventions (Level of Support Prior to Arrest):    Limited     Future Appointments   Date Time Provider Department Center   9/4/2020 11:45 AM Jeana Reyna MD [facility] None   9/4/2020  1:00 PM Sherri Langford PA-C [facility]     Additional Instructions for the Follow-ups that You Need to Schedule     Discharge Follow-up with PCP   As directed       Currently Documented PCP:    Marc Salcedo MD    PCP Phone Number:    [phone]     Follow Up Details:  Primary Care - 1 week         Discharge Follow-up with Specialty: [facility]; 3 Weeks   As directed      Specialty:  [facility]    Follow Up:  3 Weeks         Discharge Follow-up with Specialty: Dr Reyna at [place] [facility]   As directed      Specialty:  Dr Reyna at [facility]    Follow Up Details:  In 4 to 6 weeks             Called Neurology prior to discharge.  They did not need to see and suggested outpatient follow up in Neurology Clinic.    Electronically signed by Micky Vallejo MD, 07/14/20, 5:17 PM.      Time Spent on Discharge:  I spent  43   minutes on this discharge activity which included: face-to-face encounter with the patient, reviewing the data in the system, coordination of the care with the nursing staff as well as consultants, documentation, and entering orders.

## 2020-07-14 NOTE — THERAPY EVALUATION
"Acute Care - Speech Language Pathology Initial Evaluation  [facility]   Cognitive-Communication Evaluation     Patient Name: Samantha Napoles  : 1940  MRN: [id number]  Today's Date: 2020               Admit Date: 2020     Visit Dx:    ICD-10-CM ICD-9-CM   1. TIA (transient ischemic attack) G45.9 435.9   2. Examination for normal comparison for clinical research Z00.6 V70.7   3. Cognitive communication deficit R41.841 799.52     Patient Active Problem List   Diagnosis   • Coronary artery disease   • HTN (hypertension)   • Hypertensive heart disease   • Dyslipidemia   • Degenerative arthritis   • GERD (gastroesophageal reflux disease)   • TIA (transient ischemic attack)   • History of CVA (cerebrovascular accident)   • History of brain cancer   • Type 2 diabetes mellitus (CMS/HCC)   • Angina pectoris (CMS/HCC)   • Questionable Syncope   • Aortic valve sclerosis   • Grade I diastolic dysfunction     No past medical history on file.  Past Surgical History:   Procedure Laterality Date   • BRAIN SURGERY      \"brain tumor surgery\"; incomplete database   • CHOLECYSTECTOMY          SLP EVALUATION (last 72 hours)      SLP SLC Evaluation     Row Name 20 1120                   Communication Assessment/Intervention    Document Type  evaluation  -AC        Subjective Information  no complaints  -AC        Patient Observations  alert;cooperative  -AC        Patient/Family Observations  No family present.  -AC        Patient Effort  good  -AC           General Information    Patient Profile Reviewed  yes  -AC        Pertinent History Of Current Problem  79yo adm from home w/ suspected TIA when developed AMS, blurred vision. MRI negative for acute stroke. Hx CVA 2 yrs ago w/ residual L weakness and memory deficits per pt, brain CA s/p surgical intervention decades ago, COPD, mild dementia.   -AC        Precautions/Limitations, Vision  WFL with corrective lenses;for purposes of eval  -AC        " Precautions/Limitations, Hearing  WFL;for purposes of eval  -AC        Patient Level of Education  Pt reported she did not receive much schooling. 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  -AC        Prior Level of Function-Communication  cognitive-linguistic impairment;other (see comments) hx mild dementia per chart, pt reported mild memory diff  -AC        Plans/Goals Discussed with  patient;agreed upon  -AC        Barriers to Rehab  previous functional deficit;cognitive status  -AC        Patient's Goals for Discharge  take care of myself at home;return to work;return to all previous roles/activities  -AC           Pain Scale: FACES Pre/Post-Treatment    Pain: FACES Scale, Pretreatment  0-->no hurt  -AC        Pain: FACES Scale, Post-Treatment  0-->no hurt  -AC           Comprehension Assessment/Intervention    Comprehension Assessment/Intervention  Auditory Comprehension;Reading Comprehension  -AC           Auditory Comprehension Assessment/Intervention    Auditory Comprehension (Communication)  WFL  -AC        Answers Questions (Communication)  WFL;complex;yes/no;wh questions;personal  -AC        Able to Follow Commands (Communication)  WFL;2-step  -AC        Narrative Discourse  WFL;conversational level  -AC           Reading Comprehension Assessment/Intervention    Reading Comprehension (Communication)  WFL;other (see comments) for basic/fxnl tasks  -AC        Functional Reading Tasks  WFL  -AC        Reading Comprehension, Comment  Pt reported having limited reading skills.  -AC           Expression Assessment/Intervention    Expression Assessment/Intervention  verbal expression;graphic expression  -AC           Verbal Expression Assessment/Intervention    Verbal Expression  WFL  -AC        Automatic Speech (Communication)  WFL;response to greeting  -AC        Responsive Naming  WFL;simple  -AC        Confrontational Naming  WFL;high frequency  -AC         "Conversational Discourse/Fluency  WFL  -AC           Graphic Expression Assessment/Intervention    Graphic Expression  dominant hand;unable/difficult to assess;other (see comments) pt w/ L hand weakness since CVA 2 yrs ago  -AC        Biographical Information  WFL;name  -AC        Graphic Expression, Comment  Pt reported her son helps her w/ documents that require extensive writing @ baseline.  -AC           Motor Speech Assessment/Intervention    Motor Speech Function  WFL  -AC        Conversational Speech (Communication)  WFL;simple  -AC           Cognitive Assessment Intervention- SLP    Cognitive Function (Cognition)  mild impairment;moderate impairment  -AC        Orientation Status (Cognition)  WFL;person;place;time;situation  -AC        Memory (Cognitive)  moderate impairment;short-term;immediate;delayed;other (see comments) immediate: 3/5 words; 5-min delay: 0/5 words (5/5 w/ cue)  -AC        Attention (Cognitive)  WFL;other (see comments) basic assessment tasks  -AC        Thought Organization (Cognitive)  WFL;concrete divergent;abstract convergent  -AC        Problem Solving (Cognitive)  mild impairment;moderate impairment;temporal  -AC        Functional Math (Cognitive)  mild impairment;moderate impairment;money calculation  -AC           SLP Clinical Impressions    SLP Diagnosis  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 Difficult to determine what is acute vs baseline. Pt w/ hx mild dementia & reported some memory difficulty since prev CVA; however, pt reported feeling like she's having more difficulty \"thinking\" than normal and fears that she may have difficulty returning to work.  -AC        Rehab Potential/Prognosis  good  -AC        SLC Criteria for Skilled Therapy Interventions Met  yes  -AC        Functional Impact  difficulty completing home management task;difficulty completing vocational tasks  -AC           " "Recommendations    Therapy Frequency (SLP SLC)  5 days per week  -        Predicted Duration Therapy Intervention (Days)  until discharge  -AC        Anticipated Dischage Disposition (SLP)  anticipate therapy at next level of care  -          User Key  (r) = Recorded By, (t) = Taken By, (c) = Cosigned By    Initials Name Effective Dates     Primitivo Sanjana S, MS CCC-SLP 07/27/17 -              EDUCATION  The patient has been educated in the following areas:     Cognitive Impairment.    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 Difficult to determine what is acute vs baseline. Pt w/ hx mild dementia & reported some memory difficulty since prev CVA; however, pt reported feeling like she's having more difficulty \"thinking\" than normal and fears that she may have difficulty returning to work.     SLC Criteria for Skilled Therapy Interventions Met: yes  Anticipated Dischage Disposition (SLP): anticipate therapy at next level of care     Predicted Duration Therapy Intervention (Days): until discharge    Plan of Care Reviewed With: patient      SLP GOALS     Row Name 07/14/20 1120             Memory Skills Goal 1 (SLP)    Improve Memory Skills Through Goal 1 (SLP)  recalling unrelated word lists immediately;recalling unrelated word lists with an imposed delay;use memory strategies;80%;with minimal cues (75-90%)  -AC      Time Frame (Memory Skills Goal 1, SLP)  short term goal (STG)  -AC         Functional Problem Solving Skills Goal 1 (SLP)    Improve Problem Solving Through Goal 1 (SLP)  determine solutions to simple ADL/safety problems;complete organization/home management task;80%;with minimal cues (75-90%)  -AC      Time Frame (Problem Solving Goal 1, SLP)  short term goal (STG)  -AC         Functional Math Skills Goal 1 (SLP)    Improve Functional Math Skills Through Goal 1 (SLP)  complete " functional math task;complete word problems involving time;complete word problems involving money;80%;with minimal cues (75-90%)  -AC      Time Frame (Functional Math Skills Goal 1, SLP)  short term goal (STG)  -AC         Additional Goal 1 (SLP)    Additional Goal 1, SLP  LTG: Pt will improve cognitive-communication skills in order to participate in care in hospital setting and return to prior home/work environment.  -AC      Time Frame (Additional Goal 1, SLP)  by discharge  -AC        User Key  (r) = Recorded By, (t) = Taken By, (c) = Cosigned By    Initials Name Provider Type    Sanjana Dos Santos MS CCC-SLP Speech and Language Pathologist                  Time Calculation:     Time Calculation- SLP     Row Name 07/14/20 1323             Time Calculation- SLP    SLP Start Time  1120  -      SLP Received On  07/14/20  -        User Key  (r) = Recorded By, (t) = Taken By, (c) = Cosigned By    Initials Name Provider Type    Sanjana Dos Santos MS CCC-SLP Speech and Language Pathologist          Therapy Charges for Today     Code Description Service Date Service Provider Modifiers Qty    [id number]  ST EVAL SPEECH AND PROD W LANG  4 7/14/2020 Sanjana Langford MS CCC-SLP GN 1                     MS TAWNYA Aguilar  7/14/2020

## 2020-07-14 NOTE — PROGRESS NOTES
[facility] at [facility]  IP Progress Note   LOS: 0 days   Patient Care Team:  Marc Salcedo MD as PCP - General  Marc Salcedo MD as PCP - Family Medicine    Chief Complaint: Follow up for Coronary Artery Disease/near syncope/syncope    Subjective    Feels better, states that her TIA symptoms of numbness tingling and weakness have completely resolved.  Has no current chest pain or shortness of breath.  Would like to go home.  No episodes of syncope since admission here.  No arrhythmias on telemetry.    Active Hospital Problems    Diagnosis    • **TIA (transient ischemic attack) [G45.9]         • Angina pectoris (CMS/HCC) [I20.9]         • Questionable Syncope [R55]         • Coronary artery disease [I25.10]           a. Cardiac catheterization followed by stent of right coronary artery.  b. LHC  April 2005 by Dr. Reyna, showed widely patent stent of the right coronary artery, 95% stenosis in very distal/apical segment of LAD and a small caliber vessel (not amenable to catheter-based intervention),ejection fraction was 60%.  c. Echocardiogram, June 2007, showed normal LV function, moderate LVH, ejection fraction of 65% to 70%, diastolic dysfunction.  d. Cardiac catheterization, 04/29/2013, showed nonobstructive disease of the major vessels without hemodynamically significant disease, small caliber distal/apical LAD with a 60% to 70% which was felt to be the etiology of apical ischemia noted on the stress test.  Ejection fraction was 65%.  This lesion was not amenable to intervention, and medical therapy was recommended.       • Aortic valve sclerosis [I35.8]           · Echo 7-13-20:  Aortic sclerosis with mild aortic stenosis, MARIA TERESA 1.8 cm², mean gradient 9 mmHg.     • Grade I diastolic dysfunction [I51.9]           · Echo 7-13-20: Left ventricular wall thickness is consistent with mild concentric hypertrophy. Left ventricular systolic function is normal. Estimated EF = 65%. Left ventricular  "diastolic dysfunction (grade I) consistent with impaired relaxation.       • Dyslipidemia [E78.5]         • HTN (hypertension) [I10]         • Type 2 diabetes mellitus (CMS/HCC) [E11.9]      Priority: Low   • History of CVA (cerebrovascular accident) [Z86.73]    • History of brain cancer [Z85.841]    • Degenerative arthritis [M19.90]    • GERD (gastroesophageal reflux disease) [K21.9]          Tele: Sinus Rythym    Vitals:  /64 (BP Location: Left arm, Patient Position: Lying)   Pulse 57   Temp 97.6 °F (36.4 °C) (Oral)   Resp 18   Ht 170 cm (66.93\")   Wt 73.9 kg (163 lb)   SpO2 93%   BMI 25.58 kg/m²    Body mass index is 25.58 kg/m².    Intake/Output Summary (Last 24 hours) at 7/14/2020 0752  Last data filed at 7/13/2020 1234  Gross per 24 hour   Intake 960 ml   Output --   Net 960 ml       Physical Exam:  General: No apparent distress.  Neck: no JVD.  Chest:No respiratory distress, breath sounds are normal. No wheezes,  rhonchi or rales.  Cardiovascular: Normal S1 and S2, no murmer, gallop or rub.    Extremities: No edema.        Results Review:     I reviewed the patient's new clinical results.    Results from last 7 days   Lab Units 07/13/20  0336   WBC 10*3/mm3 7.48   HEMOGLOBIN g/dL 12.3   HEMATOCRIT % 37.9   PLATELETS 10*3/mm3 196     Results from last 7 days   Lab Units 07/14/20  0639 07/13/20  0336   SODIUM mmol/L  --  143   POTASSIUM mmol/L 4.6 3.5   CHLORIDE mmol/L  --  104   CO2 mmol/L  --  24.0   BUN mg/dL  --  17   CREATININE mg/dL  --  0.77   GLUCOSE mg/dL  --  183*   CALCIUM mg/dL  --  9.1     Estimated Creatinine Clearance: 59.8 mL/min (by C-G formula based on SCr of 0.77 mg/dL).  Lab Results   Component Value Date    HGBA1C 7.20 (H) 07/13/2020     Results from last 7 days   Lab Units 07/13/20  0336   TROPONIN T ng/mL <0.010         Lab Results   Component Value Date    CHOL 93 07/13/2020     Lab Results   Component Value Date    TRIG 153 (H) 07/13/2020     Lab Results   Component Value " Date    HDL 50 07/13/2020     Lab Results   Component Value Date    LDL 12 07/13/2020         Scheduled Meds:  aspirin 81 mg Oral Daily   Or      aspirin 300 mg Rectal Daily   carvedilol 3.125 mg Oral BID With Meals   cetirizine 5 mg Oral Daily   clopidogrel 75 mg Oral Daily   divalproex 500 mg Oral Q12H   donepezil 10 mg Oral Nightly   enalapril 10 mg Oral Q24H   gabapentin 600 mg Oral Q12H   heparin (porcine) 5,000 Units Subcutaneous Q8H   insulin lispro 0-7 Units Subcutaneous TID AC   isosorbide mononitrate 60 mg Oral Daily   montelukast 10 mg Oral Nightly   pantoprazole 40 mg Oral Q AM   rosuvastatin 20 mg Oral Nightly   sodium chloride 10 mL Intravenous Q12H   sodium chloride 10 mL Intravenous Q12H       Assessment :   1.  Reported history of episodes of syncope, nonspecific, unclear whether these are related to orthostasis or any neurologic etiology.  No associated palpitations to suggest arrhythmias.  2.  History of small vessel CAD not amenable to intervention with stable angina.  3.  TIA/CVA.  4.  Hypertension.  5.  Type 2 diabetes.  6.  Dyslipidemia, well controlled.      Recommendations:  1. Angina is stable, there is no evidence of ACS, she has known small vessel disease, recommend continuing medical management with aspirin, Plavix, statin, carvedilol and isosorbide  2. Orthostasis negative, no arrhythmias on telemetry, normal EF, no significant valvular disease on echocardiogram.  3. Okay to discharge to home from cardiology standpoint, will need 2-week cardiac monitor on discharge, I will arrange.  4. Follow-up with me in 4 to 6 weeks at [facility]  5. Evaluation and management of diabetes and other medical conditions per hospitalist service/neurology.    Electronically signed by BARBARA Hernandes, 07/14/20, 7:53 AM.    I have seen and examined the patient, case was discussed with the physician extender, reviewed the above note, necessary changes were made and I agree with the final note.    Jeana Reyna MD, FACC, [facility]

## 2020-07-14 NOTE — THERAPY TREATMENT NOTE
"Patient Name: Samantha Napoles  : 1940    MRN: [id number]                              Today's Date: 2020       Admit Date: 2020    Visit Dx:     ICD-10-CM ICD-9-CM   1. TIA (transient ischemic attack) G45.9 435.9   2. Examination for normal comparison for clinical research Z00.6 V70.7   3. Cognitive communication deficit R41.841 799.52     Patient Active Problem List   Diagnosis   • Coronary artery disease   • HTN (hypertension)   • Hypertensive heart disease   • Dyslipidemia   • Degenerative arthritis   • GERD (gastroesophageal reflux disease)   • TIA (transient ischemic attack)   • History of CVA (cerebrovascular accident)   • History of brain cancer   • Type 2 diabetes mellitus (CMS/HCC)   • Angina pectoris (CMS/HCC)   • Questionable Syncope   • Aortic valve sclerosis   • Grade I diastolic dysfunction     No past medical history on file.  Past Surgical History:   Procedure Laterality Date   • BRAIN SURGERY      \"brain tumor surgery\"; incomplete database   • CHOLECYSTECTOMY       General Information     Row Name 20 1335          PT Evaluation Time/Intention    Document Type  therapy note (daily note)  -NS     Mode of Treatment  individual therapy;physical therapy  -NS     Row Name 20 1335          General Information    Patient Profile Reviewed?  yes  -NS     Existing Precautions/Restrictions  fall  -NS     Row Name 20 1335          Cognitive Assessment/Intervention- PT/OT    Orientation Status (Cognition)  oriented x 3  -NS     Row Name 20 1335          Safety Issues, Functional Mobility    Safety Issues Affecting Function (Mobility)  safety precaution awareness;safety precautions follow-through/compliance;insight into deficits/self awareness  -NS     Impairments Affecting Function (Mobility)  balance;endurance/activity tolerance;sensation/sensory awareness;strength  -NS       User Key  (r) = Recorded By, (t) = Taken By, (c) = Cosigned By    Initials Name Provider " Type    NS Dianelys Zurita, [facility] Physical Therapist        Mobility     Row Name 07/14/20 0470          Bed Mobility Assessment/Treatment    Bed Mobility Assessment/Treatment  supine-sit  -NS     Supine-Sit [place] (Bed Mobility)  supervision  -NS     Assistive Device (Bed Mobility)  bed rails;head of bed elevated  -NS     Comment (Bed Mobility)  Patient able to complete without increased difficulty.   -NS     Row Name 07/14/20 5048          Transfer Assessment/Treatment    Comment (Transfers)  VCs for hand placement. Pt stood from EOB and from commode without difficulty.   -NS     Row Name 07/14/20 1332          Sit-Stand Transfer    Sit-Stand [place] (Transfers)  stand by assist  -NS     Assistive Device (Sit-Stand Transfers)  walker, front-wheeled  -NS     Row Name 07/14/20 6548          Gait/Stairs Assessment/Training    Gait/Stairs Assessment/Training  gait/ambulation assistive device  -NS     [place] Level (Gait)  contact guard;verbal cues  -NS     Assistive Device (Gait)  walker, front-wheeled  -NS     Distance in Feet (Gait)  10+250  -NS     Pattern (Gait)  step-through  -NS     Deviations/Abnormal Patterns (Gait)  base of support, narrow;michael decreased;gait speed decreased  -NS     Bilateral Gait Deviations  forward flexed posture;heel strike decreased  -NS     Comment (Gait/Stairs)  Patient ambulated at slow pace with RW, demonstrating narrow CARSON. She navigated around obstacles well with appropriate safety awareness and caution. No episodes of postural sway or LOB.   -NS       User Key  (r) = Recorded By, (t) = Taken By, (c) = Cosigned By    Initials Name Provider Type    Dianelys Brower PT Physical Therapist        Obj/Interventions     Row Name 07/14/20 0472          Therapeutic Exercise    Lower Extremity (Therapeutic Exercise)  LAQ (long arc quad), bilateral  -NS     Lower Extremity Range of Motion (Therapeutic Exercise)  ankle dorsiflexion/plantar flexion, bilateral  -NS     Exercise  Type (Therapeutic Exercise)  AROM (active range of motion)  -NS     Position (Therapeutic Exercise)  seated  -NS     Sets/Reps (Therapeutic Exercise)  1x10  -NS     Expected Outcome (Therapeutic Exercise)  facilitate normal movement patterns;improve neuromuscular control  -NS     Comment (Therapeutic Exercise)  Patient issued written HEP.  -NS     Row Name 07/14/20 4546          Static Sitting Balance    Level of Rains (Unsupported Sitting, Static Balance)  supervision  -NS     Sitting Position (Unsupported Sitting, Static Balance)  sitting on edge of bed  -NS     Time Able to Maintain Position (Unsupported Sitting, Static Balance)  1 to 2 minutes  -NS     Row Name 07/14/20 1330          Dynamic Sitting Balance    Level of Rains, Reaches Outside Midline (Sitting, Dynamic Balance)  supervision  -NS     Sitting Position, Reaches Outside Midline (Sitting, Dynamic Balance)  sitting in chair  -NS     [facility] Name 07/14/20 1337          Static Standing Balance    Level of Rains (Supported Standing, Static Balance)  standby assist  -NS     Time Able to Maintain Position (Supported Standing, Static Balance)  15 to 30 seconds  -NS     Assistive Device Utilized (Supported Standing, Static Balance)  walker, rolling  -NS     [facility] Name 07/14/20 9331          Dynamic Standing Balance    Level of Rains, Reaches Outside Midline (Standing, Dynamic Balance)  contact guard assist  -NS     Time Able to Maintain Position, Reaches Outside Midline (Standing, Dynamic Balance)  more than 5 minutes  -NS     Assistive Device Utilized (Supported Standing, Dynamic Balance)  walker, rolling  -NS       User Key  (r) = Recorded By, (t) = Taken By, (c) = Cosigned By    Initials Name Provider Type    Dianelys Brower PT Physical Therapist        Goals/Plan    No documentation.       Clinical Impression     Row Name 07/14/20 1015          Pain Assessment    Additional Documentation  Pain Scale: Numbers Pre/Post-Treatment  (Group)  -NS     [facility] Name 07/14/20 1335          Pain Scale: Numbers Pre/Post-Treatment    Pain Scale: Numbers, Pretreatment  0/10 - no pain  -NS     Pain Scale: Numbers, Post-Treatment  0/10 - no pain  -NS     Row Name 07/14/20 1335          Plan of Care Review    Plan of Care Reviewed With  patient  -NS     Progress  improving  -NS     Row Name 07/14/20 1335          Vital Signs    Pre Systolic BP Rehab  -- VSS- RN cleared for PT treatment  -NS     Pre Patient Position  Supine  -NS     Intra Patient Position  Standing  -NS     Post Patient Position  Sitting  -NS     Row Name 07/14/20 1335          Positioning and Restraints    Pre-Treatment Position  in bed  -NS     Post Treatment Position  chair  -NS     In Chair  reclined;call light within reach;encouraged to call for assist;exit alarm on;legs elevated  -NS       User Key  (r) = Recorded By, (t) = Taken By, (c) = Cosigned By    Initials Name Provider Type    Dianelys Brower, PT Physical Therapist        Outcome Measures     Row Name 07/14/20 1335          How much help from another person do you currently need...    Turning from your back to your side while in flat bed without using bedrails?  4  -NS     Moving from lying on back to sitting on the side of a flat bed without bedrails?  3  -NS     Moving to and from a bed to a chair (including a wheelchair)?  3  -NS     Standing up from a chair using your arms (e.g., wheelchair, bedside chair)?  3  -NS     Climbing 3-5 steps with a railing?  3  -NS     To walk in hospital room?  3  -NS     AM-PAC 6 Clicks Score (PT)  19  -NS     Row Name 07/14/20 1335          Modified Gove Scale    Modified Kailey Scale  3 - Moderate disability.  Requiring some help, but able to walk without assistance.  -NS     [facility] Name 07/14/20 1335          Functional Assessment    Outcome Measure Options  AM-PAC 6 Clicks Basic Mobility (PT);Modified Kailey  -NS       User Key  (r) = Recorded By, (t) = Taken By, (c) = Cosigned By     Initials Name Provider Type    Dianelys Brower PT Physical Therapist        Physical Therapy Education                 Title: PT OT SLP Therapies (Done)     Topic: Physical Therapy (Done)     Point: Mobility training (Done)     Description:   Instruct learner(s) on safety and technique for assisting patient out of bed, chair or wheelchair.  Instruct in the proper use of assistive devices, such as walker, crutches, cane or brace.              Patient Friendly Description:   It's important to get you on your feet again, but we need to do so in a way that is safe for you. Falling has serious consequences, and your personal safety is the most important thing of all.        When it's time to get out of bed, one of us or a family member will sit next to you on the bed to give you support.     If your doctor or nurse tells you to use a walker, crutches, a cane, or a brace, be sure you use it every time you get out of bed, even if you think you don't need it.    Learning Progress Summary           Patient Acceptance, E,H, VU,NR by NS at 7/14/2020 1424    Comment:  Patient was educated about safety with mobility, RW use at home, and HEP to maintain strength.    Acceptance, E, VU by JMONAE at 7/13/2020 0918    Comment:  Pt educated about HEP, bed mobility techniques and plan of care.                   Point: Home exercise program (Done)     Description:   Instruct learner(s) on appropriate technique for monitoring, assisting and/or progressing patient with therapeutic exercises and activities.              Learning Progress Summary           Patient Acceptance, E,H, VU,NR by NS at 7/14/2020 1424    Comment:  Patient was educated about safety with mobility, RW use at home, and HEP to maintain strength.    Acceptance, E, VU by JMONAE at 7/13/2020 0918    Comment:  Pt educated about HEP, bed mobility techniques and plan of care.                   Point: Body mechanics (Done)     Description:   Instruct learner(s) on proper positioning  and spine alignment for patient and/or caregiver during mobility tasks and/or exercises.              Learning Progress Summary           Patient Acceptance, E,H, VU,NR by NS at 7/14/2020 1424    Comment:  Patient was educated about safety with mobility, RW use at home, and HEP to maintain strength.    Acceptance, E, VU by  at 7/13/2020 0918    Comment:  Pt educated about HEP, bed mobility techniques and plan of care.                               User Key     Initials Effective Dates Name Provider Type Discipline    NS 09/10/19 -  Dianelys Zurita, PT Physical Therapist PT    YNES 05/14/20 -  Sherri Berry, PT Student PT Student PT              PT Recommendation and Plan     Outcome Summary/Treatment Plan (PT)  Anticipated Discharge Disposition (PT): home with assist, home with home health  Plan of Care Reviewed With: patient  Progress: improving  Outcome Summary: Patient demonstrated good progress towards PT goals this session. She transferred supine>sit with sup, STS with SBA, and ambulated 10'+250' with RW and CGA, demonstrating improved gait mechanics and no episodes of LOB. VCs for hand placement with transfers. Will continue to progress as appropriate.     Time Calculation:   PT Charges     Row Name 07/14/20 1335             Time Calculation    Start Time  1335  -NS      PT Received On  07/14/20  -NS      PT Goal Re-Cert Due Date  07/23/20  -NS         Timed Charges    41790 - PT Therapeutic Exercise Minutes  2  -NS      21388 - Gait Training Minutes   17  -NS      74998 - PT Therapeutic Activity Minutes  8  -NS        User Key  (r) = Recorded By, (t) = Taken By, (c) = Cosigned By    Initials Name Provider Type    Dianelys Brower, PT Physical Therapist        Therapy Charges for Today     Code Description Service Date Service Provider Modifiers Qty    04163678022 HC GAIT TRAINING EA 15 MIN 7/14/2020 Dianelys Zurita, PT GP 1    01130324774 HC PT THERAPEUTIC ACT EA 15 MIN 7/14/2020 Dianelys Zurita, PT GP 1           PT G-Codes  Outcome Measure Options: AM-PAC 6 Clicks Basic Mobility (PT), Modified Kailey  AM-PAC 6 Clicks Score (PT): 19  AM-PAC 6 Clicks Score (OT): 16  Modified Clear Scale: 3 - Moderate disability.  Requiring some help, but able to walk without assistance.    Dianelys Zurita, PT  7/14/2020

## 2020-07-15 ENCOUNTER — READMISSION MANAGEMENT (OUTPATIENT)
Dept: CALL CENTER | Facility: HOSPITAL | Age: 80
End: 2020-07-15

## 2020-07-15 NOTE — OUTREACH NOTE
Prep Survey      Responses   [facility] patient discharged from?  [place]   Is LACE score < 7 ?  Yes   Eligibility  Readm Mgmt   Discharge diagnosis  TIA   COVID-19 Test Status  Negative   Does the patient have one of the following disease processes/diagnoses(primary or secondary)?  Stroke (TIA)   Does the patient have Home health ordered?  No   Is there a DME ordered?  No   Prep survey completed?  Yes          Shraddha Hui RN

## 2020-07-17 ENCOUNTER — READMISSION MANAGEMENT (OUTPATIENT)
Dept: CALL CENTER | Facility: HOSPITAL | Age: 80
End: 2020-07-17

## 2020-07-17 NOTE — OUTREACH NOTE
Stroke Week 1 Survey      Responses   [facility] patient discharged from?  [place]   Does the patient have one of the following disease processes/diagnoses(primary or secondary)?  Stroke (TIA)   Is there a successful TCM telephone encounter documented?  No   Week 1 attempt successful?  Yes   Call start time  1430   Call end time  1439   Discharge diagnosis  TIA   Is patient permission given to speak with other caregiver?  Yes   List who call center can speak with  son   Meds reviewed with patient/caregiver?  Yes   Is the patient having any side effects they believe may be caused by any medication additions or changes?  No   Does the patient have all medications ordered at discharge?  Yes   Is the patient taking all medications as directed (includes completed medication regime)?  Yes   Does the patient have a primary care provider?   Yes   Does the patient have an appointment with their PCP within 7 days of discharge?  Yes   Has the patient kept scheduled appointments due by today?  N/A   Psychosocial issues?  No   Psychosocial comments  lives alone but her son comes and checks daily    Does the patient require any assistance with activities of daily living such as eating, bathing, dressing, walking, etc.?  No   Does the patient have any residual symptoms from stroke/TIA?  Yes   Residual symptoms comments  still having LLE weakness, using walker now to ambulate which is new. She feels unsteady without walker.    Does the patient understand the diet ordered at discharge?  Yes   Notified Case Management  DME   Comments  Pt c/o sleepy more than normal. C/o dizziness, head hurts when gets up to walk, heart beats funny.She has had these spells previously, she is wearing the ZIO patch. Pt does not have glucometer at home to check her glucose at home.She says that she has told her PCP 2x that she needs a glucometer prescription but he has never given her a prescription to get one.     Did the patient receive a copy  of their discharge instructions?  Yes   Nursing interventions  Reviewed instructions with patient   What is the patient's perception of their health status since discharge?  Improving   Nursing interventions  Nurse provided patient education   Is the patient/caregiver able to teach back the risk factors for a stroke?  Diabetes, High blood pressure-goal below 120/80   Is the patient/caregiver able to teach back signs and symptoms related to disease process for when to call PCP?  Yes   Is the patient/caregiver able to teach back the hierarchy of who to call/visit for symptoms/problems? PCP, Specialist, Home health nurse, Urgent Care, ED, 911  Yes   Week 1 call completed?  Yes          Lena Garcia RN

## 2020-07-28 ENCOUNTER — READMISSION MANAGEMENT (OUTPATIENT)
Dept: CALL CENTER | Facility: HOSPITAL | Age: 80
End: 2020-07-28

## 2020-07-28 NOTE — OUTREACH NOTE
Stroke Week 2 Survey      Responses   [facility] patient discharged from?  [place]   Does the patient have one of the following disease processes/diagnoses(primary or secondary)?  Stroke (TIA)   Week 2 attempt successful?  Yes   Call start time  1154   Call end time  1205   Discharge diagnosis  TIA   Meds reviewed with patient/caregiver?  Yes   Is the patient taking all medications as directed (includes completed medication regime)?  Yes   Medication comments  Pt does not have glucometer   Does the patient have a primary care provider?   Yes   Has the patient kept scheduled appointments due by today?  Yes   Comments  Pt told the NP at f/u [facility] that she did not have glucometer but she did not give her Rx for that.    Psychosocial issues?  No   Does the patient require any assistance with activities of daily living such as eating, bathing, dressing, walking, etc.?  No   Does the patient have any residual symptoms from stroke/TIA?  No   Residual symptoms comments  Pt denies weakness/numbness today.    Does the patient understand the diet ordered at discharge?  Yes   Notified Case Management  DME   Comments  Pt is no longer wearing the ZIO patch, she sent it back. Pt still c/o HA & dizziness, HA is more noticable when she leans over. This was occurring prior to hosp stay per her report.    What is the patient's perception of their health status since discharge?  Improving   Is the patient/caregiver able to teach back the risk factors for a stroke?  Diabetes   Is the patient/caregiver able to teach back signs and symptoms related to disease process for when to call 911?  Yes   Is the patient/caregiver able to teach back the hierarchy of who to call/visit for symptoms/problems? PCP, Specialist, Home health nurse, Urgent Care, ED, 911  Yes   Week 2 call completed?  Yes          Lena Garcia RN

## 2020-07-28 NOTE — OUTREACH NOTE
Case Management Call Center Follow-up      Responses   [facility] Call Center Tracking Reason?  No DME   Has the Call Center Case Management Follow-up issue been resolved?  No   Follow-up Comments  Attempted to call pt. No answer and voicemail not set up. Will try again           YAMILETH Thornton    7/28/2020, 14:13

## 2020-08-06 ENCOUNTER — READMISSION MANAGEMENT (OUTPATIENT)
Dept: CALL CENTER | Facility: HOSPITAL | Age: 80
End: 2020-08-06

## 2020-08-06 NOTE — OUTREACH NOTE
Stroke Week 3 Survey      Responses   [facility] patient discharged from?  [place]   Does the patient have one of the following disease processes/diagnoses(primary or secondary)?  Stroke (TIA)   Week 3 attempt successful?  No   Unsuccessful attempts  Attempt 1          Nichelle Gaffney RN

## 2020-08-11 ENCOUNTER — READMISSION MANAGEMENT (OUTPATIENT)
Dept: CALL CENTER | Facility: HOSPITAL | Age: 80
End: 2020-08-11

## 2020-08-11 NOTE — OUTREACH NOTE
Stroke Week 3 Survey      Responses   [facility] patient discharged from?  [place]   Does the patient have one of the following disease processes/diagnoses(primary or secondary)?  Stroke (TIA)   Week 3 attempt successful?  Yes   Call start time  1019   Call end time  1022   Discharge diagnosis  TIA   Meds reviewed with patient/caregiver?  Yes   Is the patient taking all medications as directed (includes completed medication regime)?  Yes   Has the patient kept scheduled appointments due by today?  Yes   Does the patient require any assistance with activities of daily living such as eating, bathing, dressing, walking, etc.?  No   Does the patient have any residual symptoms from stroke/TIA?  No   Does the patient understand the diet ordered at discharge?  Yes   Comments  Pt reports no longer having dizziness.    What is the patient's perception of their health status since discharge?  Returned to baseline/stable   Is the patient able to teach back FAST for Stroke?  Yes   Is the patient/caregiver able to teach back the risk factors for a stroke?  High blood pressure-goal below 120/80   Is the patient/caregiver able to teach back signs and symptoms related to disease process for when to call 911?  Yes   Is the patient/caregiver able to teach back the hierarchy of who to call/visit for symptoms/problems? PCP, Specialist, Home health nurse, Urgent Care, ED, 911  Yes   Week 3 call completed?  Yes   Revoked  No further contact(revokes)-requires comment   Graduated/Revoked comments  baseline          Lena Garcia RN

## 2020-09-04 ENCOUNTER — LAB (OUTPATIENT)
Dept: LAB | Facility: HOSPITAL | Age: 80
End: 2020-09-04

## 2020-09-04 ENCOUNTER — OFFICE VISIT (OUTPATIENT)
Dept: NEUROLOGY | Facility: CLINIC | Age: 80
End: 2020-09-04

## 2020-09-04 VITALS
SYSTOLIC BLOOD PRESSURE: 100 MMHG | DIASTOLIC BLOOD PRESSURE: 60 MMHG | BODY MASS INDEX: 26.31 KG/M2 | HEIGHT: 66 IN | TEMPERATURE: 97.1 F

## 2020-09-04 DIAGNOSIS — R41.3 MEMORY LOSS: Primary | ICD-10-CM

## 2020-09-04 DIAGNOSIS — R53.1 LEFT-SIDED WEAKNESS: ICD-10-CM

## 2020-09-04 PROCEDURE — 82607 VITAMIN B-12: CPT | Performed by: PHYSICIAN ASSISTANT

## 2020-09-04 PROCEDURE — 99215 OFFICE O/P EST HI 40 MIN: CPT | Performed by: PHYSICIAN ASSISTANT

## 2020-09-04 PROCEDURE — 84443 ASSAY THYROID STIM HORMONE: CPT | Performed by: PHYSICIAN ASSISTANT

## 2020-09-04 PROCEDURE — 82746 ASSAY OF FOLIC ACID SERUM: CPT | Performed by: PHYSICIAN ASSISTANT

## 2020-09-04 PROCEDURE — 80164 ASSAY DIPROPYLACETIC ACD TOT: CPT | Performed by: PHYSICIAN ASSISTANT

## 2020-09-04 PROCEDURE — 36415 COLL VENOUS BLD VENIPUNCTURE: CPT | Performed by: PHYSICIAN ASSISTANT

## 2020-09-04 RX ORDER — ALPRAZOLAM 0.5 MG/1
0.5 TABLET ORAL NIGHTLY PRN
COMMUNITY
Start: 2020-08-27

## 2020-09-04 RX ORDER — LORATADINE 10 MG/1
TABLET ORAL
COMMUNITY
Start: 2020-07-28

## 2020-09-04 RX ORDER — MECLIZINE HYDROCHLORIDE 25 MG/1
TABLET ORAL
COMMUNITY
Start: 2020-05-29

## 2020-09-04 RX ORDER — INFLUENZA A VIRUS A/MICHIGAN/45/2015 X-275 (H1N1) ANTIGEN (FORMALDEHYDE INACTIVATED), INFLUENZA A VIRUS A/SINGAPORE/INFIMH-16-0019/2016 IVR-186 (H3N2) ANTIGEN (FORMALDEHYDE INACTIVATED), INFLUENZA B VIRUS B/PHUKET/3073/2013 ANTIGEN (FORMALDEHYDE INACTIVATED), AND INFLUENZA B VIRUS B/MARYLAND/15/2016 BX-69A ANTIGEN (FORMALDEHYDE INACTIVATED) 60; 60; 60; 60 UG/.7ML; UG/.7ML; UG/.7ML; UG/.7ML
INJECTION, SUSPENSION INTRAMUSCULAR
COMMUNITY
Start: 2020-09-03 | End: 2023-04-01 | Stop reason: ALTCHOICE

## 2020-09-04 RX ORDER — EVOLOCUMAB 420 MG/3.5
KIT SUBCUTANEOUS
COMMUNITY
Start: 2020-09-02

## 2020-09-04 RX ORDER — CARVEDILOL 6.25 MG/1
TABLET ORAL
COMMUNITY
Start: 2020-06-25 | End: 2022-05-09 | Stop reason: DRUGHIGH

## 2020-09-04 RX ORDER — LISINOPRIL 30 MG/1
TABLET ORAL
COMMUNITY
Start: 2012-08-16 | End: 2022-08-26 | Stop reason: ALTCHOICE

## 2020-09-04 RX ORDER — ICOSAPENT ETHYL 1000 MG/1
CAPSULE ORAL
COMMUNITY
Start: 2020-07-28

## 2020-09-04 RX ORDER — NITROGLYCERIN 0.4 MG/1
TABLET SUBLINGUAL
COMMUNITY
Start: 2020-06-25

## 2020-09-04 NOTE — PROGRESS NOTES
"Subjective       Chief Complaint: left lower extremity weakness      History of Present Illness   Samantha Napoles is a 80 y.o. female who comes to clinic today following a hospitalization at [facility] in 7/20. She noted chest pain and left lower extremity weakness and numbness just prior to her admission. There was associated left facial weakness as well as a sharp occipital headache with nausea, vomiting, and light sensitivity. She also noted associated blurry vision. Workup included an MRI of the brain that was unremarkable. A CTA of the head and neck and echo were unremarkable for any cardio-embolic source. She was continued on ASA and Plavix as well as Lipitor 80mg daily. She was also treated with VPA 500mg daily for suspected migraine. Since her hospitalization, she has continue to note mild headaches as well as left sided numbness.     She also reports cognitive impairment. She has noted symptoms for at least several years marked initially by forgetfulness and word-finding difficulties. This has worsened  over time. Additional symptoms have included impairments in orientation . There have been associated  symptoms of anxiety  and depression . She denies  impairments in ADL's. She manages her medications and finances. She is currently residing independently.     She is currently taking donepezil.    She reports a history of brain tumor and underwent a craniotomy in 1963.       I have reviewed and confirmed the past family, social and medical history as accurate on 9/4/2020.     Review of Systems   Constitutional: Negative.    Eyes: Negative.    Respiratory: Negative.    Cardiovascular: Negative.    Gastrointestinal: Negative.    Endocrine: Negative.    Genitourinary: Negative.    Musculoskeletal: Negative.    Skin: Negative.    Allergic/Immunologic: Negative.    Neurological: Positive for numbness and headaches.   Hematological: Negative.        Objective     /60   Temp 97.1 °F (36.2 °C)   Ht 167.6 cm (66\")  "  BMI 26.31 kg/m²     General appearance today is normal.       Physical Exam   Neurological: She has normal strength. She has a normal Finger-Nose-Finger Test and a normal Heel to Shin Test.   Reflex Scores:       Patellar reflexes are 1+ on the right side and 1+ on the left side.  Psychiatric: Her speech is normal.        Neurologic Exam     Mental Status   Speech: speech is normal   Level of consciousness: alert  Normal comprehension.     Cranial Nerves   Cranial nerves II through XII intact.     Motor Exam   Muscle bulk: normal  Overall muscle tone: normal    Strength   Strength 5/5 throughout.     Sensory Exam   Light touch normal.     Gait, Coordination, and Reflexes     Gait  Gait: (antalgic)    Coordination   Finger to nose coordination: normal  Heel to shin coordination: normal    Tremor   Resting tremor: absent    Reflexes   Right patellar: 1+  Left patellar: 1+        Assessment/Plan   Samantha was seen today for migraine.    Diagnoses and all orders for this visit:    Memory loss  -     TSH  -     Vitamin B12  -     Folate  -     Valproic Acid Level, Total          Discussion/Summary   Samantha Napoles comes to clinic today with a history of left sided weakness, suspected to be related to a complicated migraine. As for her memory loss, it is possible that her symptoms are related to Mild Cognitive Impairment based on her history, though I am concerned that some of her medications (particularly xanax) are negatively affecting her cognition. This was discussed. It was elected to obtain screening blood work . After discussing potential treatment options, it was elected to continue on her current medication regimen unchanged. She will then follow up in 6 months, at which time we will more formally evaluate her cognition, or sooner if needed.   I spent 45 minutes face to face with the patient and family with 25 minutes spent on discussing diagnosis, prognosis, diagnostic testing, evaluation, current status,  treatment options and management as discussed above.       As part of this visit I reviewed prior lab results, reviewed radiology results, reviewed radiology images, obtained additional history from the family which is incorporated in the HPI and reviewed records from prior hospitalizations which is incorporated in the HPI.      Sherri Langford PA-C

## 2020-09-05 LAB
FOLATE SERPL-MCNC: 13.7 NG/ML (ref 4.78–24.2)
TSH SERPL DL<=0.05 MIU/L-ACNC: 3.42 UIU/ML (ref 0.27–4.2)
VALPROATE SERPL-MCNC: 52 MCG/ML (ref 50–125)
VIT B12 BLD-MCNC: 988 PG/ML (ref 211–946)

## 2020-09-14 DIAGNOSIS — I67.9 CEREBROVASCULAR DISEASE: Primary | ICD-10-CM

## 2021-11-10 ENCOUNTER — OFFICE VISIT (OUTPATIENT)
Dept: UROLOGY | Facility: CLINIC | Age: 81
End: 2021-11-10

## 2021-11-10 ENCOUNTER — HOSPITAL ENCOUNTER (OUTPATIENT)
Dept: GENERAL RADIOLOGY | Facility: HOSPITAL | Age: 81
Discharge: HOME | End: 2021-11-10
Admitting: NURSE PRACTITIONER

## 2021-11-10 VITALS — BODY MASS INDEX: 25.49 KG/M2 | WEIGHT: 153 LBS | HEIGHT: 65 IN

## 2021-11-10 DIAGNOSIS — N20.0 KIDNEY STONE: ICD-10-CM

## 2021-11-10 DIAGNOSIS — R35.0 FREQUENCY OF URINATION: ICD-10-CM

## 2021-11-10 DIAGNOSIS — N21.0 BLADDER STONES: Primary | ICD-10-CM

## 2021-11-10 DIAGNOSIS — N39.0 URINARY TRACT INFECTION WITHOUT HEMATURIA, SITE UNSPECIFIED: ICD-10-CM

## 2021-11-10 LAB
BILIRUB BLD-MCNC: NEGATIVE MG/DL
CLARITY, POC: ABNORMAL
COLOR UR: YELLOW
EXPIRATION DATE: ABNORMAL
GLUCOSE UR STRIP-MCNC: NEGATIVE MG/DL
KETONES UR QL: NEGATIVE
LEUKOCYTE EST, POC: ABNORMAL
Lab: ABNORMAL
NITRITE UR-MCNC: NEGATIVE MG/ML
PH UR: 5.5 [PH] (ref 5–8)
PROT UR STRIP-MCNC: ABNORMAL MG/DL
RBC # UR STRIP: ABNORMAL /UL
SP GR UR: 1.01 (ref 1–1.03)
UROBILINOGEN UR QL: NORMAL

## 2021-11-10 PROCEDURE — 74018 RADEX ABDOMEN 1 VIEW: CPT

## 2021-11-10 PROCEDURE — 87086 URINE CULTURE/COLONY COUNT: CPT | Performed by: NURSE PRACTITIONER

## 2021-11-10 PROCEDURE — 99214 OFFICE O/P EST MOD 30 MIN: CPT | Performed by: NURSE PRACTITIONER

## 2021-11-10 PROCEDURE — 74018 RADEX ABDOMEN 1 VIEW: CPT | Performed by: RADIOLOGY

## 2021-11-10 PROCEDURE — 81003 URINALYSIS AUTO W/O SCOPE: CPT | Performed by: NURSE PRACTITIONER

## 2021-11-10 NOTE — PROGRESS NOTES
"Chief Complaint  BLADDER STONES /ACUTE CYSTITIS WITH HEMATURIA/DYSURIA/URINE  (NEW PATIENT WITH BLADDER STONES/DYSURIA/UI)    Subjective          Samantha Napoles presents to [facility]  History of Present Illness    Ms. Samantha Montelongo is a very pleasant, but frail and ill looking 81-year-old female with significant/complex medical history who presents to clinic today for evaluation.  She has been referred to us by her PCP with concerns of bladder stones.     The patient reports numerous other concerns including Urine Frequency with incomplete bladder emptying. SHe has urinary symptoms of urgency, frequency, hesitancy, and has to strain to get urine out.  SHe does not empty her bladder and dribbles alot she states.  Although SHe reports this has worsened over the last few months. SHe denies dysuria, Back/flank pain.  She does not have recurrent UTIs she reports, HER urine dipstick today showed trace leukocyte esterase, negative nitrites, negative gross but show 2+ microscopic hematuria.    We both reviewed/discussed her KUB results show a significant 10 mm x 12 calcification noted in her pelvis, consistent with a bladder stone.  We discussed  how obstruction of the urethra by bladder stone causes urinary retention by blocking the normal flow of urine out of the body.  We discussed  conditions that may trigger these such as urethral structure, kidney stones, urinary tract stones, cystocele, rectocele, constipation, certain tumors and cancers.  I think her bladder stone has a lot to do with her symptoms.    She is a G3, P3, former smoker and quit in 1985.  She has a significant left concave scoliotic lumbar curvature, significant history of brain cancer post surgery x3, pleasantly confused, CAD with cardiac stents.  Now on Plavix.  Rest of her PMHx as listed below.    Objective   Vital Signs:   Ht 165.1 cm (65\")   Wt 69.4 kg (153 lb)   BMI 25.46 kg/m²     Physical " Exam  Constitutional:       General: She is in acute distress.      Appearance: She is well-developed. She is ill-appearing.   HENT:      Head: Normocephalic and atraumatic.   Eyes:      Pupils: Pupils are equal, round, and reactive to light.   Neck:      Thyroid: No thyromegaly.      Trachea: No tracheal deviation.   Cardiovascular:      Rate and Rhythm: Normal rate and regular rhythm.      Heart sounds: No murmur heard.      Pulmonary:      Effort: Pulmonary effort is normal. No respiratory distress.      Breath sounds: Normal breath sounds. No stridor. No wheezing.   Abdominal:      General: Bowel sounds are normal. There is distension.      Palpations: Abdomen is soft.      Tenderness: There is abdominal tenderness.   Genitourinary:     Labia:         Right: No tenderness.         Left: No tenderness.       Vagina: Normal. No vaginal discharge.   Musculoskeletal:         General: Tenderness present. No deformity. Normal range of motion.      Cervical back: Normal range of motion.   Skin:     General: Skin is warm and dry.      Capillary Refill: Capillary refill takes less than 2 seconds.      Coloration: Skin is pale.      Findings: No erythema or rash.   Neurological:      Mental Status: She is alert and oriented to person, place, and time.      Cranial Nerves: No cranial nerve deficit.      Sensory: No sensory deficit.      Coordination: Coordination normal.   Psychiatric:         Behavior: Behavior normal.         Thought Content: Thought content normal.         Judgment: Judgment normal.      Comments: Alert, pleasantly confused, mild brain lapses        Result Review :     UA    Urinalysis 11/10/21   Ketones, UA Negative   Leukocytes, UA Trace (A)   (A) Abnormal value            Urine Culture    Urine Culture 11/10/21   Urine Culture 25,000 CFU/mL Normal Urogenital Charis                     Assessment and Plan    Diagnoses and all orders for this visit:    1. Bladder stones (Primary)  -     tamsulosin  (FLOMAX) 0.4 MG capsule 24 hr capsule; Take 1 capsule by mouth Daily.  Dispense: 30 capsule; Refill: 5  -     ondansetron (Zofran) 4 MG tablet; Take 1 tablet by mouth Daily As Needed for Nausea or Vomiting.  Dispense: 30 tablet; Refill: 1  -     ketorolac (TORADOL) 10 MG tablet; Take 1 tablet by mouth Every 6 (Six) Hours As Needed for Moderate Pain .  Dispense: 16 tablet; Refill: 0    2. Frequency of urination  -     POC Urinalysis Dipstick, Automated  -     Case Request; Standing  -     COVID PRE-OP / PRE-PROCEDURE SCREENING ORDER (NO ISOLATION) - Swab, Nasopharynx; Future  -     Case Request  -     ketorolac (TORADOL) 10 MG tablet; Take 1 tablet by mouth Every 6 (Six) Hours As Needed for Moderate Pain .  Dispense: 16 tablet; Refill: 0    3. Urinary tract infection without hematuria, site unspecified  -     Urine Culture - Urine, Urine, Clean Catch  -     Case Request; Standing  -     COVID PRE-OP / PRE-PROCEDURE SCREENING ORDER (NO ISOLATION) - Swab, Nasopharynx; Future  -     Case Request  -     ondansetron (Zofran) 4 MG tablet; Take 1 tablet by mouth Daily As Needed for Nausea or Vomiting.  Dispense: 30 tablet; Refill: 1  -     ketorolac (TORADOL) 10 MG tablet; Take 1 tablet by mouth Every 6 (Six) Hours As Needed for Moderate Pain .  Dispense: 16 tablet; Refill: 0    4. Kidney stone  -     XR abdomen kub; Future  -     Case Request; Standing  -     COVID PRE-OP / PRE-PROCEDURE SCREENING ORDER (NO ISOLATION) - Swab, Nasopharynx; Future  -     Case Request  -     tamsulosin (FLOMAX) 0.4 MG capsule 24 hr capsule; Take 1 capsule by mouth Daily.  Dispense: 30 capsule; Refill: 5  -     ondansetron (Zofran) 4 MG tablet; Take 1 tablet by mouth Daily As Needed for Nausea or Vomiting.  Dispense: 30 tablet; Refill: 1  -     ketorolac (TORADOL) 10 MG tablet; Take 1 tablet by mouth Every 6 (Six) Hours As Needed for Moderate Pain .  Dispense: 16 tablet; Refill: 0    Other orders  -     Follow Anesthesia Guidelines / Standing  Orders; Future  -     Provide NPO Instructions to Patient; Future  -     Chlorhexidine Skin Prep; Future       BLADDER STONE/ACUTE CYSTITIS WITH HEMATURIA/URINARU INCONTINENCE  We discussed the significance of microscopic hematuria versus gross hematuria.  Patient does not have gross hematuria.  SHe has 2+ microscopic hematuria  in her  urine dipstick today, this is very consistent with her bladder stone.     Discussed A bladder stone is a buildup of crystals made from the proteins and minerals found in urine. These substances build up when your urine becomes too concentrated. Bladder stones usually develop when you have another medical condition that prevents your bladder from emptying completely. Crystals can form in the small amount of urine left in your bladder.  We discussed the fact that, bladder stones that grow large can become painful and block the flow of urine. Bladder stones can prevent the bladder from emptying well or recurrent urinary tract infection (UTI).    We sent her urine for culture, I will call her results if any positive bacterial growth.    Discuss starting antibiotic therapy if urine culture positive.    She has been encouraged to increase p.o. fluid intake, and this 8 to 10 glasses daily.    We discussed the presence of the bladder stone and the need for surgical intervention.  Patient agreeable to plan of care.    Patient has been scheduled for Cystolitholapaxy with Dr. CERVANTES on December 6 of 2021, Pending cardiac clearance at this time.    Discussed she continue conservative therapy at this time alternating NSAIDs/Tylenol for pain and discomfort.  Also Flomax, and nausea medicine as needed.    She is agreeable plan of care,     Follow Up   Return in 26 days (on 12/6/2021) for Next scheduled follow up, With Dr. Cervantes FOR CYSTOLITHOLAPAXY.  Patient was given instructions and counseling regarding her condition or for health maintenance advice. Please see specific information pulled into  the AVS if appropriate.

## 2021-11-11 LAB — BACTERIA SPEC AEROBE CULT: NORMAL

## 2021-11-16 PROBLEM — N20.0 KIDNEY STONE: Status: ACTIVE | Noted: 2021-11-16

## 2021-11-16 PROBLEM — N39.0 URINARY TRACT INFECTION WITHOUT HEMATURIA: Status: ACTIVE | Noted: 2021-11-16

## 2021-11-16 PROBLEM — R35.0 FREQUENCY OF URINATION: Status: ACTIVE | Noted: 2021-11-16

## 2021-11-16 RX ORDER — GENTAMICIN SULFATE 80 MG/100ML
80 INJECTION, SOLUTION INTRAVENOUS ONCE
Status: CANCELLED | OUTPATIENT
Start: 2021-12-06 | End: 2021-11-16

## 2021-11-18 PROBLEM — N21.0 BLADDER STONES: Status: ACTIVE | Noted: 2021-11-18

## 2021-11-18 RX ORDER — TAMSULOSIN HYDROCHLORIDE 0.4 MG/1
1 CAPSULE ORAL DAILY
Qty: 30 CAPSULE | Refills: 5 | Status: SHIPPED | OUTPATIENT
Start: 2021-11-18 | End: 2023-04-01 | Stop reason: ALTCHOICE

## 2021-11-18 RX ORDER — KETOROLAC TROMETHAMINE 10 MG/1
10 TABLET, FILM COATED ORAL EVERY 6 HOURS PRN
Qty: 16 TABLET | Refills: 0 | Status: SHIPPED | OUTPATIENT
Start: 2021-11-18

## 2021-11-18 RX ORDER — ONDANSETRON 4 MG/1
4 TABLET, FILM COATED ORAL DAILY PRN
Qty: 30 TABLET | Refills: 1 | Status: SHIPPED | OUTPATIENT
Start: 2021-11-18 | End: 2023-04-01

## 2021-11-19 NOTE — PATIENT INSTRUCTIONS
Urinary Tract Infection, Adult    A urinary tract infection (UTI) is an infection of any part of the urinary tract. The urinary tract includes the kidneys, ureters, bladder, and urethra. These organs make, store, and get rid of urine in the body.  Your health care provider may use other names to describe the infection. An upper UTI affects the ureters and kidneys (pyelonephritis). A lower UTI affects the bladder (cystitis) and urethra (urethritis).  What are the causes?  Most urinary tract infections are caused by bacteria in your genital area, around the entrance to your urinary tract (urethra). These bacteria grow and cause inflammation of your urinary tract.  What increases the risk?  You are more likely to develop this condition if:  · You have a urinary catheter that stays in place (indwelling).  · You are not able to control when you urinate or have a bowel movement (you have incontinence).  · You are female and you:  ? Use a spermicide or diaphragm for birth control.  ? Have low estrogen levels.  ? Are pregnant.  · You have certain genes that increase your risk (genetics).  · You are sexually active.  · You take antibiotic medicines.  · You have a condition that causes your flow of urine to slow down, such as:  ? An enlarged prostate, if you are male.  ? Blockage in your urethra (stricture).  ? A kidney stone.  ? A nerve condition that affects your bladder control (neurogenic bladder).  ? Not getting enough to drink, or not urinating often.  · You have certain medical conditions, such as:  ? Diabetes.  ? A weak disease-fighting system (immunesystem).  ? Sickle cell disease.  ? Gout.  ? Spinal cord injury.  What are the signs or symptoms?  Symptoms of this condition include:  · Needing to urinate right away (urgently).  · Frequent urination or passing small amounts of urine frequently.  · Pain or burning with urination.  · Blood in the urine.  · Urine that smells bad or unusual.  · Trouble urinating.  · Cloudy  urine.  · Vaginal discharge, if you are female.  · Pain in the abdomen or the lower back.  You may also have:  · Vomiting or a decreased appetite.  · Confusion.  · Irritability or tiredness.  · A fever.  · Diarrhea.  The first symptom in older adults may be confusion. In some cases, they may not have any symptoms until the infection has worsened.  How is this diagnosed?  This condition is diagnosed based on your medical history and a physical exam. You may also have other tests, including:  · Urine tests.  · Blood tests.  · Tests for sexually transmitted infections (STIs).  If you have had more than one UTI, a cystoscopy or imaging studies may be done to determine the cause of the infections.  How is this treated?  Treatment for this condition includes:  · Antibiotic medicine.  · Over-the-counter medicines to treat discomfort.  · Drinking enough water to stay hydrated.  If you have frequent infections or have other conditions such as a kidney stone, you may need to see a health care provider who specializes in the urinary tract (urologist).  In rare cases, urinary tract infections can cause sepsis. Sepsis is a life-threatening condition that occurs when the body responds to an infection. Sepsis is treated in the hospital with IV antibiotics, fluids, and other medicines.  Follow these instructions at home:    Medicines  · Take over-the-counter and prescription medicines only as told by your health care provider.  · If you were prescribed an antibiotic medicine, take it as told by your health care provider. Do not stop using the antibiotic even if you start to feel better.  General instructions  · Make sure you:  ? Empty your bladder often and completely. Do not hold urine for long periods of time.  ? Empty your bladder after sex.  ? Wipe from front to back after a bowel movement if you are female. Use each tissue one time when you wipe.  · Drink enough fluid to keep your urine pale yellow.  · Keep all follow-up  visits as told by your health care provider. This is important.  Contact a health care provider if:  · Your symptoms do not get better after 1-2 days.  · Your symptoms go away and then return.  Get help right away if you have:  · Severe pain in your back or your lower abdomen.  · A fever.  · Nausea or vomiting.  Summary  · A urinary tract infection (UTI) is an infection of any part of the urinary tract, which includes the kidneys, ureters, bladder, and urethra.  · Most urinary tract infections are caused by bacteria in your genital area, around the entrance to your urinary tract (urethra).  · Treatment for this condition often includes antibiotic medicines.  · If you were prescribed an antibiotic medicine, take it as told by your health care provider. Do not stop using the antibiotic even if you start to feel better.  · Keep all follow-up visits as told by your health care provider. This is important.  This information is not intended to replace advice given to you by your health care provider. Make sure you discuss any questions you have with your health care provider.  Document Revised: 12/05/2019 Document Reviewed: 06/27/2019  [employer] Patient Education © 2021 [employer]  Renal Colic    Renal colic is pain that is caused by passing a kidney stone. The pain can be sharp and severe. It may be felt in the back, abdomen, side (flank), or groin. It can cause nausea. Renal colic can come and go.  Follow these instructions at home:  Watch your condition for any changes. The following actions may help to lessen any discomfort that you are feeling:  Medicines  · Take over-the-counter and prescription medicines only as told by your health care provider.  · Do not drive or use heavy machinery while taking prescription pain medicine.  Eating and drinking    · Drink enough fluid to keep your urine pale yellow. You may be instructed to drink at least 8-10 glasses of water each day. Follow instructions from your health care  provider.  · If directed, change your diet. This may include:  ? Limiting how much sodium you eat. You may need to eat less than 2 grams (2,000 mg) per day.  ? Eating more fruits and vegetables.  ? Limiting how much animal protein, such as red meat, poultry, fish, and eggs, you eat.  ? Avoiding foods such as spinach, rhubarb, sweet potatoes, and nuts. These make kidney stones more likely to form.  · Follow instructions from your health care provider about eating or drinking restrictions.    General instructions  · Keep all follow-up visits as told by your health care provider. This is important.  · Collect urine samples as told by your health care provider. You may need to collect a urine sample:  ? 24 hours after you pass the stone.  ? 8-12 weeks after passing the kidney stone, and every 6-12 months after that.  · Strain your urine every time you urinate, for as long as directed. Use the strainer that your health care provider recommends.  · Do not throw out the kidney stone after passing it. Keep the stone so it can be tested by your health care provider. Testing the makeup of your kidney stone may help understand how to prevent you from getting kidney stones in the future.  Contact a health care provider if:  · You have a fever or chills.  · Your urine smells bad or looks cloudy.  · You have pain or burning when you pass urine.  Get help right away if:  · Your flank pain or groin pain suddenly worsens.  · You become confused or disoriented or you lose consciousness.  Summary  · Renal colic is pain that is caused by passing a kidney stone.  · Take over-the-counter and prescription medicines only as told by your health care provider.  · Drink enough fluid to keep your urine pale yellow. You may be instructed to drink at least 8-10 glasses of water each day. Follow instructions from your health care provider.  · Strain your urine every time you urinate, for as long as directed. Use the strainer that your health care  provider recommends.  · Do not throw out the kidney stone after passing it. Keep the stone so it can be tested by your health care provider.  This information is not intended to replace advice given to you by your health care provider. Make sure you discuss any questions you have with your health care provider.  Document Revised: 01/15/2019 Document Reviewed: 01/15/2019  [employer] Patient Education © 2021 [employer]  Bladder Stone    A bladder stone is a buildup of crystals made from the proteins and minerals found in urine. These substances build up when urine becomes too concentrated. Urine is concentrated when there is less water and more proteins and minerals in it.  Bladder stones usually develop when a person has another medical condition that prevents the bladder from emptying completely. Crystals can form in the small amount of urine that is left in the bladder. Bladder stones that grow large can become painful and may block the flow of urine.  What are the causes?  This condition may be caused by:  · An enlarged prostate, which prevents the bladder from emptying well.  · An infection of a part of your urinary system (urinary tract infection, or UTI). This includes the:  ? Kidneys.  ? Bladder.  ? Ureters. These are the tubes that carry urine to your bladder.  ? Urethra. This is the tube that drains urine from your bladder.  · A weak spot in the bladder that creates a small pouch (bladder diverticulum).  · Nerve damage that may interfere with the signals from your brain to your bladder muscles (neurogenic bladder). This can result from conditions such as Parkinson's disease or spinal cord injuries.  What increases the risk?  This condition is more likely to develop in people who:  · Get frequent UTIs.  · Have another medical condition that affects the bladder.  · Have a history of bladder surgery.  · Have a spinal cord injury.  · Have an abnormal shape of the bladder (deformity).  What are the signs or  symptoms?  Common symptoms of this condition include:  · Pain in the abdomen.  · A need to urinate more often.  · Difficulty or pain when urinating.  · Blood in the urine.  · Cloudy urine or urine that is dark in color.  · Pain in the penis or testicles in men.  Small bladder stones do not always cause symptoms.  How is this diagnosed?  This condition may be diagnosed based on your symptoms, medical history, and physical exam. The physical exam will check for tenderness in your abdomen. For men, an exam in the rectum may be done to check the prostate gland.  · You may have tests, such as:  ? A urine test (urinalysis).  ? A urine sample test to check for other infections (culture).  ? Blood tests, including tests to look for a certain substance (creatinine). A creatinine level that is higher than normal could indicate a blockage.  ? A procedure to check your bladder using a scope with a camera (cystoscopy).  · You may also have imaging studies, such as:  ? CT scan or ultrasound of your abdomen and the area between your hip bones (pelvis or pelvic area).  ? An X-ray of your urinary system.  How is this treated?  This condition may be treated with:  · Cystolitholapaxy. This procedure uses a laser, ultrasound, or other device to break the stone into smaller pieces. Fluids are used to flush the small pieces from the area.  · Surgery to remove the stone.  · A stent. This is a small mesh tube that is threaded into your ureter to make urine flow.  · Medicines to treat pain.  Follow these instructions at home:  Medicines  · Take over-the-counter and prescription medicines only as told by your health care provider.  · Ask your health care provider if the medicine prescribed to you:  ? Requires you to avoid driving or using heavy machinery.  ? Can cause constipation. You may need to take these actions to prevent or treat constipation:  § Take over-the-counter or prescription medicines.  § Eat foods that are high in fiber, such  as beans, whole grains, and fresh fruits and vegetables.  § Limit foods that are high in fat and processed sugars, such as fried or sweet foods.  Alcohol use  · Do not drink alcohol if:  ? Your health care provider tells you not to drink.  ? You are pregnant, may be pregnant, or are planning to become pregnant.  · If you drink alcohol:  ? Limit how much you drink to:  § 0-1 drink a day for women.  § 0-2 drinks a day for men.  ? Be aware of how much alcohol is in your drink. In the [place] one drink equals one 12 oz bottle of beer (355 mL), one 5 oz glass of wine (148 mL), or one 1½ oz glass of hard liquor (44 mL).  Activity  · Rest as told by your health care provider.  · Return to your normal activities as told by your health care provider. Ask your health care provider what activities are safe for you.  General instructions    · Drink enough fluid to keep your urine pale yellow.  · Tell your health care provider about any unusual symptoms related to urinating. Early diagnosis of an enlarged prostate and other bladder conditions may reduce your risk of getting bladder stones.  · Do not use any products that contain nicotine or tobacco, such as cigarettes, e-cigarettes, or chewing tobacco. If you need help quitting, ask your health care provider.  · Do not use drugs.    Where to find more information  [facility] ([facility]): www.urologyhealth.org  Contact a health care provider if you:  · Have a fever.  · Feel nauseous or vomit.  · Are unable to urinate.  · Have a large amount of blood in your urine.  Get help right away if you:  · Have severe back pain or pain in the lower part of your abdomen.  · Cannot eat or drink without vomiting.  · Vomit after taking your medicine.  Summary  · A bladder stone is a buildup of crystals made from the proteins and minerals found in urine. These substances build up when urine becomes too concentrated.  · Bladder stones that grow large can become painful and may block the flow  of urine.  · Bladder stones may be treated with a laser, a stent, surgery, or pain medicines.  This information is not intended to replace advice given to you by your health care provider. Make sure you discuss any questions you have with your health care provider.  Document Revised: 07/09/2020 Document Reviewed: 07/09/2020  [employer] Patient Education © 2021 [employer]

## 2021-12-03 ENCOUNTER — LAB (OUTPATIENT)
Dept: LAB | Facility: HOSPITAL | Age: 81
End: 2021-12-03

## 2021-12-03 ENCOUNTER — PRE-ADMISSION TESTING (OUTPATIENT)
Dept: PREADMISSION TESTING | Facility: HOSPITAL | Age: 81
End: 2021-12-03

## 2021-12-03 DIAGNOSIS — N39.0 URINARY TRACT INFECTION WITHOUT HEMATURIA, SITE UNSPECIFIED: ICD-10-CM

## 2021-12-03 DIAGNOSIS — R35.0 FREQUENCY OF URINATION: ICD-10-CM

## 2021-12-03 DIAGNOSIS — N20.0 KIDNEY STONE: ICD-10-CM

## 2021-12-03 LAB
ANION GAP SERPL CALCULATED.3IONS-SCNC: 10.1 MMOL/L (ref 5–15)
BUN SERPL-MCNC: 12 MG/DL (ref 8–23)
BUN/CREAT SERPL: 14.8 (ref 7–25)
CALCIUM SPEC-SCNC: 9 MG/DL (ref 8.6–10.5)
CHLORIDE SERPL-SCNC: 102 MMOL/L (ref 98–107)
CO2 SERPL-SCNC: 25.9 MMOL/L (ref 22–29)
CREAT SERPL-MCNC: 0.81 MG/DL (ref 0.57–1)
DEPRECATED RDW RBC AUTO: 45.4 FL (ref 37–54)
ERYTHROCYTE [DISTWIDTH] IN BLOOD BY AUTOMATED COUNT: 13.2 % (ref 12.3–15.4)
GFR SERPL CREATININE-BSD FRML MDRD: 68 ML/MIN/1.73
GLUCOSE SERPL-MCNC: 206 MG/DL (ref 65–99)
HCT VFR BLD AUTO: 36.1 % (ref 34–46.6)
HGB BLD-MCNC: 11.7 G/DL (ref 12–15.9)
MCH RBC QN AUTO: 30.6 PG (ref 26.6–33)
MCHC RBC AUTO-ENTMCNC: 32.4 G/DL (ref 31.5–35.7)
MCV RBC AUTO: 94.5 FL (ref 79–97)
PLATELET # BLD AUTO: 196 10*3/MM3 (ref 140–450)
PMV BLD AUTO: 11.2 FL (ref 6–12)
POTASSIUM SERPL-SCNC: 3.9 MMOL/L (ref 3.5–5.2)
RBC # BLD AUTO: 3.82 10*6/MM3 (ref 3.77–5.28)
SODIUM SERPL-SCNC: 138 MMOL/L (ref 136–145)
WBC NRBC COR # BLD: 6.65 10*3/MM3 (ref 3.4–10.8)

## 2021-12-03 PROCEDURE — 36415 COLL VENOUS BLD VENIPUNCTURE: CPT

## 2021-12-03 PROCEDURE — C9803 HOPD COVID-19 SPEC COLLECT: HCPCS

## 2021-12-03 PROCEDURE — 80048 BASIC METABOLIC PNL TOTAL CA: CPT

## 2021-12-03 PROCEDURE — U0004 COV-19 TEST NON-CDC HGH THRU: HCPCS

## 2021-12-03 PROCEDURE — 85027 COMPLETE CBC AUTOMATED: CPT

## 2021-12-03 NOTE — DISCHARGE INSTRUCTIONS

## 2021-12-04 LAB — SARS-COV-2 RNA PNL SPEC NAA+PROBE: NOT DETECTED

## 2021-12-06 ENCOUNTER — ANESTHESIA EVENT (OUTPATIENT)
Dept: PERIOP | Facility: HOSPITAL | Age: 81
End: 2021-12-06

## 2021-12-06 ENCOUNTER — ANESTHESIA (OUTPATIENT)
Dept: PERIOP | Facility: HOSPITAL | Age: 81
End: 2021-12-06

## 2021-12-06 ENCOUNTER — APPOINTMENT (OUTPATIENT)
Dept: GENERAL RADIOLOGY | Facility: HOSPITAL | Age: 81
End: 2021-12-06

## 2021-12-06 ENCOUNTER — HOSPITAL ENCOUNTER (OUTPATIENT)
Facility: HOSPITAL | Age: 81
Setting detail: HOSPITAL OUTPATIENT SURGERY
Discharge: HOME | End: 2021-12-06
Attending: UROLOGY | Admitting: UROLOGY

## 2021-12-06 VITALS
HEIGHT: 65 IN | WEIGHT: 162 LBS | BODY MASS INDEX: 26.99 KG/M2 | OXYGEN SATURATION: 97 % | HEART RATE: 65 BPM | RESPIRATION RATE: 18 BRPM | SYSTOLIC BLOOD PRESSURE: 142 MMHG | DIASTOLIC BLOOD PRESSURE: 78 MMHG | TEMPERATURE: 97.8 F

## 2021-12-06 DIAGNOSIS — R35.0 FREQUENCY OF URINATION: ICD-10-CM

## 2021-12-06 DIAGNOSIS — N20.0 KIDNEY STONE: ICD-10-CM

## 2021-12-06 DIAGNOSIS — N39.0 URINARY TRACT INFECTION WITHOUT HEMATURIA, SITE UNSPECIFIED: ICD-10-CM

## 2021-12-06 LAB — GLUCOSE BLDC GLUCOMTR-MCNC: 187 MG/DL (ref 70–130)

## 2021-12-06 PROCEDURE — 52000 CYSTOURETHROSCOPY: CPT | Performed by: UROLOGY

## 2021-12-06 PROCEDURE — 25010000002 FENTANYL CITRATE (PF) 50 MCG/ML SOLUTION: Performed by: NURSE ANESTHETIST, CERTIFIED REGISTERED

## 2021-12-06 PROCEDURE — 25010000002 GENTAMICIN PER 80 MG: Performed by: UROLOGY

## 2021-12-06 PROCEDURE — 25010000002 ONDANSETRON PER 1 MG: Performed by: NURSE ANESTHETIST, CERTIFIED REGISTERED

## 2021-12-06 PROCEDURE — 82962 GLUCOSE BLOOD TEST: CPT

## 2021-12-06 PROCEDURE — 25010000002 PROPOFOL 10 MG/ML EMULSION: Performed by: NURSE ANESTHETIST, CERTIFIED REGISTERED

## 2021-12-06 RX ORDER — ATROPA BELLADONNA AND OPIUM 16.2; 3 MG/1; MG/1
SUPPOSITORY RECTAL AS NEEDED
Status: DISCONTINUED | OUTPATIENT
Start: 2021-12-06 | End: 2021-12-06 | Stop reason: HOSPADM

## 2021-12-06 RX ORDER — ONDANSETRON 2 MG/ML
4 INJECTION INTRAMUSCULAR; INTRAVENOUS AS NEEDED
Status: DISCONTINUED | OUTPATIENT
Start: 2021-12-06 | End: 2021-12-06 | Stop reason: HOSPADM

## 2021-12-06 RX ORDER — MAGNESIUM HYDROXIDE 1200 MG/15ML
LIQUID ORAL AS NEEDED
Status: DISCONTINUED | OUTPATIENT
Start: 2021-12-06 | End: 2021-12-06 | Stop reason: HOSPADM

## 2021-12-06 RX ORDER — SODIUM CHLORIDE, SODIUM LACTATE, POTASSIUM CHLORIDE, CALCIUM CHLORIDE 600; 310; 30; 20 MG/100ML; MG/100ML; MG/100ML; MG/100ML
125 INJECTION, SOLUTION INTRAVENOUS ONCE
Status: COMPLETED | OUTPATIENT
Start: 2021-12-06 | End: 2021-12-06

## 2021-12-06 RX ORDER — OXYCODONE HYDROCHLORIDE AND ACETAMINOPHEN 5; 325 MG/1; MG/1
1 TABLET ORAL ONCE AS NEEDED
Status: DISCONTINUED | OUTPATIENT
Start: 2021-12-06 | End: 2021-12-06 | Stop reason: HOSPADM

## 2021-12-06 RX ORDER — GENTAMICIN SULFATE 80 MG/100ML
80 INJECTION, SOLUTION INTRAVENOUS ONCE
Status: COMPLETED | OUTPATIENT
Start: 2021-12-06 | End: 2021-12-06

## 2021-12-06 RX ORDER — ONDANSETRON 2 MG/ML
INJECTION INTRAMUSCULAR; INTRAVENOUS AS NEEDED
Status: DISCONTINUED | OUTPATIENT
Start: 2021-12-06 | End: 2021-12-06 | Stop reason: SURG

## 2021-12-06 RX ORDER — SODIUM CHLORIDE, SODIUM LACTATE, POTASSIUM CHLORIDE, CALCIUM CHLORIDE 600; 310; 30; 20 MG/100ML; MG/100ML; MG/100ML; MG/100ML
100 INJECTION, SOLUTION INTRAVENOUS ONCE AS NEEDED
Status: DISCONTINUED | OUTPATIENT
Start: 2021-12-06 | End: 2021-12-06 | Stop reason: HOSPADM

## 2021-12-06 RX ORDER — PROPOFOL 10 MG/ML
VIAL (ML) INTRAVENOUS AS NEEDED
Status: DISCONTINUED | OUTPATIENT
Start: 2021-12-06 | End: 2021-12-06 | Stop reason: SURG

## 2021-12-06 RX ORDER — MEPERIDINE HYDROCHLORIDE 25 MG/ML
12.5 INJECTION INTRAMUSCULAR; INTRAVENOUS; SUBCUTANEOUS
Status: DISCONTINUED | OUTPATIENT
Start: 2021-12-06 | End: 2021-12-06 | Stop reason: HOSPADM

## 2021-12-06 RX ORDER — FAMOTIDINE 10 MG/ML
INJECTION, SOLUTION INTRAVENOUS AS NEEDED
Status: DISCONTINUED | OUTPATIENT
Start: 2021-12-06 | End: 2021-12-06 | Stop reason: SURG

## 2021-12-06 RX ORDER — MIDAZOLAM HYDROCHLORIDE 1 MG/ML
0.5 INJECTION INTRAMUSCULAR; INTRAVENOUS
Status: DISCONTINUED | OUTPATIENT
Start: 2021-12-06 | End: 2021-12-06 | Stop reason: HOSPADM

## 2021-12-06 RX ORDER — FENTANYL CITRATE 50 UG/ML
INJECTION, SOLUTION INTRAMUSCULAR; INTRAVENOUS AS NEEDED
Status: DISCONTINUED | OUTPATIENT
Start: 2021-12-06 | End: 2021-12-06 | Stop reason: SURG

## 2021-12-06 RX ORDER — SODIUM CHLORIDE 0.9 % (FLUSH) 0.9 %
10 SYRINGE (ML) INJECTION EVERY 12 HOURS SCHEDULED
Status: DISCONTINUED | OUTPATIENT
Start: 2021-12-06 | End: 2021-12-06 | Stop reason: HOSPADM

## 2021-12-06 RX ORDER — DROPERIDOL 2.5 MG/ML
0.62 INJECTION, SOLUTION INTRAMUSCULAR; INTRAVENOUS ONCE AS NEEDED
Status: DISCONTINUED | OUTPATIENT
Start: 2021-12-06 | End: 2021-12-06 | Stop reason: HOSPADM

## 2021-12-06 RX ORDER — IPRATROPIUM BROMIDE AND ALBUTEROL SULFATE 2.5; .5 MG/3ML; MG/3ML
3 SOLUTION RESPIRATORY (INHALATION) ONCE AS NEEDED
Status: DISCONTINUED | OUTPATIENT
Start: 2021-12-06 | End: 2021-12-06 | Stop reason: HOSPADM

## 2021-12-06 RX ORDER — SODIUM CHLORIDE 0.9 % (FLUSH) 0.9 %
10 SYRINGE (ML) INJECTION AS NEEDED
Status: DISCONTINUED | OUTPATIENT
Start: 2021-12-06 | End: 2021-12-06 | Stop reason: HOSPADM

## 2021-12-06 RX ORDER — SODIUM CHLORIDE, SODIUM LACTATE, POTASSIUM CHLORIDE, CALCIUM CHLORIDE 600; 310; 30; 20 MG/100ML; MG/100ML; MG/100ML; MG/100ML
INJECTION, SOLUTION INTRAVENOUS CONTINUOUS PRN
Status: DISCONTINUED | OUTPATIENT
Start: 2021-12-06 | End: 2021-12-06 | Stop reason: SURG

## 2021-12-06 RX ORDER — FENTANYL CITRATE 50 UG/ML
50 INJECTION, SOLUTION INTRAMUSCULAR; INTRAVENOUS
Status: DISCONTINUED | OUTPATIENT
Start: 2021-12-06 | End: 2021-12-06 | Stop reason: HOSPADM

## 2021-12-06 RX ORDER — LIDOCAINE HYDROCHLORIDE 20 MG/ML
INJECTION, SOLUTION INFILTRATION; PERINEURAL AS NEEDED
Status: DISCONTINUED | OUTPATIENT
Start: 2021-12-06 | End: 2021-12-06 | Stop reason: SURG

## 2021-12-06 RX ADMIN — SODIUM CHLORIDE, POTASSIUM CHLORIDE, SODIUM LACTATE AND CALCIUM CHLORIDE 125 ML/HR: 600; 310; 30; 20 INJECTION, SOLUTION INTRAVENOUS at 09:00

## 2021-12-06 RX ADMIN — FENTANYL CITRATE 50 MCG: 50 INJECTION INTRAMUSCULAR; INTRAVENOUS at 09:20

## 2021-12-06 RX ADMIN — LIDOCAINE HYDROCHLORIDE 60 MG: 20 INJECTION, SOLUTION INFILTRATION; PERINEURAL at 09:20

## 2021-12-06 RX ADMIN — FAMOTIDINE 20 MG: 10 INJECTION INTRAVENOUS at 09:16

## 2021-12-06 RX ADMIN — GENTAMICIN SULFATE 80 MG: 80 INJECTION, SOLUTION INTRAVENOUS at 09:24

## 2021-12-06 RX ADMIN — SODIUM CHLORIDE, POTASSIUM CHLORIDE, SODIUM LACTATE AND CALCIUM CHLORIDE: 600; 310; 30; 20 INJECTION, SOLUTION INTRAVENOUS at 09:16

## 2021-12-06 RX ADMIN — ONDANSETRON 4 MG: 2 INJECTION INTRAMUSCULAR; INTRAVENOUS at 09:16

## 2021-12-06 RX ADMIN — PROPOFOL 120 MG: 10 INJECTION, EMULSION INTRAVENOUS at 09:20

## 2021-12-06 NOTE — ANESTHESIA PROCEDURE NOTES
Airway  Urgency: elective    Date/Time: 12/6/2021 9:21 AM    General Information and Staff    Patient location during procedure: OR    Indications and Patient Condition    Preoxygenated: yes  Mask difficulty assessment: 0 - not attempted    Final Airway Details  Final airway type: supraglottic airway      Successful airway: unique  Size 4    Number of attempts at approach: 1  Assessment: lips, teeth, and gum same as pre-op

## 2021-12-06 NOTE — ANESTHESIA PREPROCEDURE EVALUATION
Anesthesia Evaluation     Patient summary reviewed and Nursing notes reviewed   NPO Solid Status: > 8 hours  NPO Liquid Status: > 8 hours           Airway   Mallampati: I  TM distance: >3 FB  Neck ROM: full  Dental    (+) upper dentures    Pulmonary     breath sounds clear to auscultation  (+) COPD,   Cardiovascular   Exercise tolerance: poor (<4 METS)    ECG reviewed  Rhythm: regular  Rate: normal    (+) hypertension, CAD, angina, hyperlipidemia,       Neuro/Psych  (+) TIA, CVA (weak right hand) residual symptoms, syncope,     GI/Hepatic/Renal/Endo    (+)  GERD,  renal disease, diabetes mellitus,     Musculoskeletal     Abdominal     Abdomen: soft.   Substance History      OB/GYN          Other   arthritis,    history of cancer                    Anesthesia Plan    ASA 3     general     intravenous induction     Anesthetic plan, all risks, benefits, and alternatives have been provided, discussed and informed consent has been obtained with: patient.

## 2021-12-06 NOTE — ANESTHESIA POSTPROCEDURE EVALUATION
Patient: Samantha Napoles    Procedure Summary     Date: 12/06/21 Room / Location:  [facility] 06 /  [facility]    Anesthesia Start: 0916 Anesthesia Stop: 0936    Procedure: CYSTOscopy (N/A ) Diagnosis:       Kidney stone      Frequency of urination      Urinary tract infection without hematuria, site unspecified      (Kidney stone [N20.0])      (Frequency of urination [R35.0])      (Urinary tract infection without hematuria, site unspecified [N39.0])    Surgeons: Robby Simons MD Provider: Billy Ross MD    Anesthesia Type: general ASA Status: 3          Anesthesia Type: general    Vitals  Vitals Value Taken Time   /79 12/06/21 0937   Temp 98 °F (36.7 °C) 12/06/21 0937   Pulse 64 12/06/21 0937   Resp 21 12/06/21 0937   SpO2 97 % 12/06/21 0937           Post Anesthesia Care and Evaluation    Patient location during evaluation: bedside  Patient participation: complete - patient participated  Level of consciousness: awake and alert  Pain score: 1  Pain management: adequate  Airway patency: patent  Anesthetic complications: No anesthetic complications  PONV Status: none  Cardiovascular status: acceptable  Respiratory status: acceptable  Hydration status: acceptable

## 2021-12-06 NOTE — OP NOTE
Cystoscopy   procedure Note    Samantha Napoles  12/6/2021    Pre-op Diagnosis:   Kidney stone [N20.0]  Frequency of urination [R35.0]  Urinary tract infection without hematuria, site unspecified [N39.0]    Post-op Diagnosis:     Post-Op Diagnosis Codes:     * Kidney stone [N20.0]     * Frequency of urination [R35.0]     * Urinary tract infection without hematuria, site unspecified [N39.0]    Procedure/CPT® Codes:    81-year-old white female who had a CT with a questionable bladder stone presents for cystoscopy litholapaxy following informed consent brought the operative suite prepped and draped in a sterile fashion had a normal vaginal orifice other than a cystocele rectocele had a normal bladder no stones tumors foreign body she tolerated it well  Procedure(s):  CYSTOscopy    Surgeon(s):  Robby Simons MD    Anesthesia: see anesthesia record    Staff:   Circulator: Miley Peterson RN  Scrub Person: Tracy Garcia LPN; Rosa Richter    Estimated Blood Loss: none  Urine Voided: * No values recorded between 12/6/2021  9:15 AM and 12/6/2021  9:35 AM *    Specimens:                None      Drains: None    Findings: Normal cystoscopic findings    Blood: N/A    Complications: None    Grafts and Implants: None    Robby Simons MD     Date: 12/6/2021  Time: 09:36 EST

## 2022-03-28 ENCOUNTER — OFFICE VISIT (OUTPATIENT)
Dept: UROLOGY | Facility: CLINIC | Age: 82
End: 2022-03-28

## 2022-03-28 VITALS — BODY MASS INDEX: 27 KG/M2 | WEIGHT: 162.04 LBS | HEIGHT: 65 IN

## 2022-03-28 DIAGNOSIS — N81.10 CYSTOCELE WITH RECTOCELE: ICD-10-CM

## 2022-03-28 DIAGNOSIS — R35.0 FREQUENCY OF URINATION: Primary | ICD-10-CM

## 2022-03-28 DIAGNOSIS — N81.6 CYSTOCELE WITH RECTOCELE: ICD-10-CM

## 2022-03-28 DIAGNOSIS — N39.0 URINARY TRACT INFECTION WITHOUT HEMATURIA, SITE UNSPECIFIED: ICD-10-CM

## 2022-03-28 LAB
BILIRUB BLD-MCNC: NEGATIVE MG/DL
CLARITY, POC: CLEAR
COLOR UR: YELLOW
EXPIRATION DATE: 2023
GLUCOSE UR STRIP-MCNC: ABNORMAL MG/DL
KETONES UR QL: NEGATIVE
LEUKOCYTE EST, POC: ABNORMAL
Lab: ABNORMAL
NITRITE UR-MCNC: POSITIVE MG/ML
PH UR: 5 [PH] (ref 5–8)
PROT UR STRIP-MCNC: NEGATIVE MG/DL
RBC # UR STRIP: ABNORMAL /UL
SP GR UR: 1.02 (ref 1–1.03)
UROBILINOGEN UR QL: NORMAL

## 2022-03-28 PROCEDURE — 99214 OFFICE O/P EST MOD 30 MIN: CPT | Performed by: NURSE PRACTITIONER

## 2022-03-28 PROCEDURE — 96372 THER/PROPH/DIAG INJ SC/IM: CPT | Performed by: NURSE PRACTITIONER

## 2022-03-28 PROCEDURE — 87086 URINE CULTURE/COLONY COUNT: CPT | Performed by: NURSE PRACTITIONER

## 2022-03-28 PROCEDURE — 81003 URINALYSIS AUTO W/O SCOPE: CPT | Performed by: NURSE PRACTITIONER

## 2022-03-28 PROCEDURE — 87147 CULTURE TYPE IMMUNOLOGIC: CPT | Performed by: NURSE PRACTITIONER

## 2022-03-28 RX ORDER — GENTAMICIN SULFATE 40 MG/ML
80 INJECTION, SOLUTION INTRAMUSCULAR; INTRAVENOUS ONCE
Status: COMPLETED | OUTPATIENT
Start: 2022-03-28 | End: 2022-03-28

## 2022-03-28 RX ADMIN — GENTAMICIN SULFATE 80 MG: 40 INJECTION, SOLUTION INTRAMUSCULAR; INTRAVENOUS at 16:26

## 2022-03-28 NOTE — PROGRESS NOTES
"Chief Complaint  Urinary Tract Infection (3-month follow-up recurrent UTI/acute cystitis/cystocele/rectocele)    Subjective          Samantha Napoles presents to [facility] for acute cystitis with hematuria  History of Present Illness     Ms. Samantha Montelongo is a pleasant 81-year-old female  patient who returns to clinic today for evaluation.  This is a 3-month follow-up for lower urinary tract symptoms including Urine Frequency with incomplete bladder emptying. SHe also has urinary symptoms of urgency, frequency, hesitancy, and has to strain to get urine out.  SHe does not empty her bladder and dribbles alot she states.      Although SHe reports her symptoms have worsened over the last few months, SHe denies dysuria, or any burning with urination, back/flank pain, she does not have any CVA tenderness.  She does not have recurrent UTIs but reports she is bothered by straining to urinate.  Patient has a significant cystocele/rectocele, reports she would like to get it fixed.  HER urine dipstick today showed 1+ leukocyte esterase, positive nitrites, negative gross but show trace microscopic hematuria.     She is a G3, P3, former smoker and quit in 1985.  She has a significant left concave scoliotic lumbar curvature, significant history of brain cancer post surgery x3, pleasantly confused, CAD with cardiac stents.  Now on Plavix.  Rest of her PMHx as listed below.     Objective   Vital Signs:   Ht 165.1 cm (65\")   Wt 73.5 kg (162 lb 0.6 oz)   BMI 26.96 kg/m²     Physical Exam  Constitutional:       General: She is in acute distress.      Appearance: She is well-developed. She is obese.   HENT:      Head: Normocephalic and atraumatic.   Eyes:      Pupils: Pupils are equal, round, and reactive to light.   Neck:      Thyroid: No thyromegaly.      Trachea: No tracheal deviation.   Cardiovascular:      Rate and Rhythm: Normal rate and regular rhythm.      Heart sounds: No " murmur heard.  Pulmonary:      Effort: Pulmonary effort is normal. No respiratory distress.      Breath sounds: Normal breath sounds. No stridor. No wheezing.   Abdominal:      General: Bowel sounds are normal. There is distension.      Palpations: Abdomen is soft.      Tenderness: There is abdominal tenderness.   Genitourinary:     Labia:         Right: No tenderness.         Left: No tenderness.       Vagina: Normal. No vaginal discharge.      Comments: Soft, nontender abdomen no palpable masses organomegaly, rigidity guarding or tenderness no evidence of stress incontinence or hypermobility.  Grade 3 large asymptomatic rectocele/cystocele, that does not protrude beyond the introitus      Musculoskeletal:         General: Tenderness present. No deformity. Normal range of motion.      Cervical back: Normal range of motion.   Skin:     General: Skin is warm and dry.      Capillary Refill: Capillary refill takes less than 2 seconds.      Coloration: Skin is not pale.      Findings: No erythema or rash.   Neurological:      Mental Status: She is alert and oriented to person, place, and time.      Cranial Nerves: No cranial nerve deficit.      Sensory: No sensory deficit.      Coordination: Coordination normal.   Psychiatric:         Behavior: Behavior normal.         Thought Content: Thought content normal.         Judgment: Judgment normal.        Result Review :     CMP    CMP 12/3/21   Glucose 206 (A)   BUN 12   Creatinine 0.81   eGFR Non African Am 68   Sodium 138   Potassium 3.9   Chloride 102   Calcium 9.0   (A) Abnormal value            CBC w/diff    CBC w/Diff 12/3/21   WBC 6.65   RBC 3.82   Hemoglobin 11.7 (A)   Hematocrit 36.1   MCV 94.5   MCH 30.6   MCHC 32.4   RDW 13.2   Platelets 196   (A) Abnormal value            Renal Profile    Renal Profile 12/3/21   BUN 12   Creatinine 0.81   eGFR Non African Am 68           UA    Urinalysis 11/10/21 3/28/22   Ketones, UA Negative Negative   Leukocytes, UA Trace (A)  Small (1+) (A)   (A) Abnormal value            Urine Culture    Urine Culture 11/10/21 3/28/22   Urine Culture 25,000 CFU/mL Normal Urogenital Charis 25,000 CFU/mL Streptococcus agalactiae (Group B) (A)   (A) Abnormal value       Comments are available for some flowsheets but are not being displayed.                     Assessment and Plan    Problem List Items Addressed This Visit        Genitourinary and Reproductive     Frequency of urination - Primary    Overview     Added automatically from request for surgery [id number]           Relevant Orders    POC Urinalysis Dipstick, Automated (Completed)    Urine Culture - Urine, Urine, Clean Catch (Completed)    Urinary tract infection without hematuria    Overview     Added automatically from request for surgery [id number]           Relevant Orders    POC Urinalysis Dipstick, Automated (Completed)    Urine Culture - Urine, Urine, Clean Catch (Completed)    Ambulatory Referral to Gynecology (Completed)      Other Visit Diagnoses     Cystocele with rectocele        Relevant Orders    Ambulatory Referral to Gynecology (Completed)          ASESSMENT  ACUTE CYSTITIS WITH HEMATURIA/URINARY INCONTINENCE/CYSTOCELE  Ms. Samantha Montelongo is a pleasant 81-year-old female  patient who returns to clinic today for evaluation. This is a 3-month follow-up for lower urinary tract symptoms including Urine Frequency with incomplete bladder emptying. SHe also has urinary symptoms of urgency, frequency, hesitancy, and has to strain to get urine out.  SHe does not empty her bladder and dribbles alot she states.   She has a significant cystocele/rectocele she would like to get fixed.  Her urine dipstick today show 1+ leukocyte esterase, with positive nitrite .  She has trace microscopic hematuria..       Recurrent urinary tract infections/OverActiveBladder/Atrophic/yeast vaginitis: She is in apparent distress, although reports a remarkable improvement in her overall symptoms.   Again,  We  discussed the types of organisms that are found in the urinary tract indicating that the vast majority are results of the patient's own gastrointestinal dennise.  We discussed how many of the antibiotics that are utilized can actually exacerbate these infections by creating resistant organisms and there is only a very few antibiotics that are concentrated in the urine and do not affect the rectal reservoir nor cause recurrent yeast vaginitis.      We discussed the risk factors for recurrent infections being intercourse in younger patients and atrophic changes in older patients.  We discussed the symptoms that are found including pain, pressure, burning, frequency, urgency suprapubic pain and painful intercourse.  I discussed upper tract symptoms including fevers, chills, and indicated the workup would be much more aggressive if the patient were to present with recurrent infections in the face of upper tract symptomatology such as fever. I discussed the history of vesicoureteral reflux in young patients and finally chronic renal scarring as a result of such.             PLAN  Pelvic organ prolapse with cystocele/rectocele-referral placed to OB/GYN Dr. Efrain Ramirez    We resent her urine for culture. I will call her with results if any bacteria growth    Gentamicin 80 mg IM x1 dose given in clinic    Discussed restarting Macrobid 100 mg PO Daily -Suppressive Therapy.     She has been encouraged to increase her p.o. fluid intake to at least 1 to 2 L daily and avoid bladder irritants such as caffeine products, spicy foods, and citrusy foods.     I recommend concomitant probiotics with treatment with antibiotics to protect the rectal reservoir including over-the-counter yogurt preparations to abelardo oral pills containing the appropriate probiotics. Patient reports the diligent use of Probiotics.    She is to also continue other medications for yeast vaginitis, atrophic vaginitis as prescribed above.    Also continue  Flomax 0.4 mg daily for incomplete bladder emptying    Will see her back  for Follow up in clinic and recheck her urinalysis at that time.    Patient is agreeable to plan of care.     Patient reports that she is not currently experiencing any symptoms of urinary incontinence.    Patient's Body mass index is 26.96 kg/m². indicating that she is overweight (BMI 25-29.9). Patient's (Body mass index is 26.96 kg/m².) indicates that they are overweight with health conditions that include hypertension, dyslipidemias and GERD . Weight is improving with lifestyle modifications. BMI is 26.96. We discussed portion control and increasing exercise. .    Smoking Cessation Counseling:  Former smoker.  Quit in 1988  Patient does not currently use any tobacco products.     Follow Up   Return in about 3 months (around 6/28/2022) for Next scheduled follow up, RECURRENT UTI/DYSURIA/DETRUSSOR INSTABILITY.  Patient was given instructions and counseling regarding her condition or for health maintenance advice. Please see specific information pulled into the AVS if appropriate.

## 2022-03-29 LAB — BACTERIA SPEC AEROBE CULT: ABNORMAL

## 2022-03-31 ENCOUNTER — TELEPHONE (OUTPATIENT)
Dept: UROLOGY | Facility: CLINIC | Age: 82
End: 2022-03-31

## 2022-03-31 NOTE — TELEPHONE ENCOUNTER
----- Message from Griselda Cheng-Akwa, APRN sent at 3/30/2022 11:58 AM EDT -----  Please let patient know her urine culture was negative.  Group B strep is not uropathogen, she should continue her therapy and follow-up as discussed thank you    Urine Culture   25,000 CFU/mL Streptococcus agalactiae (Group B) Abnormal          ----- Message -----  From: Sherri Oconnor MA  Sent: 3/28/2022   3:39 PM EDT  To: Griselda Cheng-Akwa, APRN

## 2022-05-09 ENCOUNTER — OFFICE VISIT (OUTPATIENT)
Dept: UROLOGY | Facility: CLINIC | Age: 82
End: 2022-05-09

## 2022-05-09 VITALS — HEIGHT: 65 IN | BODY MASS INDEX: 26.99 KG/M2 | WEIGHT: 162 LBS

## 2022-05-09 DIAGNOSIS — N39.46 MIXED STRESS AND URGE URINARY INCONTINENCE: Primary | ICD-10-CM

## 2022-05-09 DIAGNOSIS — R35.0 FREQUENCY OF URINATION: ICD-10-CM

## 2022-05-09 DIAGNOSIS — R30.0 DYSURIA: ICD-10-CM

## 2022-05-09 DIAGNOSIS — N39.0 RECURRENT UTI: ICD-10-CM

## 2022-05-09 DIAGNOSIS — B37.31 YEAST VAGINITIS: ICD-10-CM

## 2022-05-09 DIAGNOSIS — R33.9 INCOMPLETE BLADDER EMPTYING: ICD-10-CM

## 2022-05-09 LAB
BILIRUB BLD-MCNC: NEGATIVE MG/DL
CLARITY, POC: CLEAR
COLOR UR: YELLOW
EXPIRATION DATE: ABNORMAL
GLUCOSE UR STRIP-MCNC: ABNORMAL MG/DL
KETONES UR QL: NEGATIVE
LEUKOCYTE EST, POC: NEGATIVE
Lab: ABNORMAL
NITRITE UR-MCNC: NEGATIVE MG/ML
PH UR: 6 [PH] (ref 5–8)
PROT UR STRIP-MCNC: NEGATIVE MG/DL
RBC # UR STRIP: NEGATIVE /UL
SP GR UR: 1.01 (ref 1–1.03)
UROBILINOGEN UR QL: NORMAL

## 2022-05-09 PROCEDURE — 87086 URINE CULTURE/COLONY COUNT: CPT | Performed by: NURSE PRACTITIONER

## 2022-05-09 PROCEDURE — 81003 URINALYSIS AUTO W/O SCOPE: CPT | Performed by: NURSE PRACTITIONER

## 2022-05-09 PROCEDURE — 51798 US URINE CAPACITY MEASURE: CPT | Performed by: NURSE PRACTITIONER

## 2022-05-09 PROCEDURE — 99214 OFFICE O/P EST MOD 30 MIN: CPT | Performed by: NURSE PRACTITIONER

## 2022-05-09 RX ORDER — CARVEDILOL 3.12 MG/1
3.12 TABLET ORAL 2 TIMES DAILY
COMMUNITY
Start: 2022-04-26

## 2022-05-09 RX ORDER — MIRABEGRON 25 MG/1
25 TABLET, FILM COATED, EXTENDED RELEASE ORAL DAILY
COMMUNITY
Start: 2022-04-26 | End: 2022-05-09 | Stop reason: SDUPTHER

## 2022-05-09 RX ORDER — FLUCONAZOLE 100 MG/1
100 TABLET ORAL DAILY
Qty: 3 TABLET | Refills: 0 | Status: SHIPPED | OUTPATIENT
Start: 2022-05-09 | End: 2022-05-12

## 2022-05-09 RX ORDER — AMLODIPINE BESYLATE 2.5 MG/1
2.5 TABLET ORAL DAILY
COMMUNITY
Start: 2022-04-26

## 2022-05-09 RX ORDER — DAPAGLIFLOZIN 10 MG/1
1 TABLET, FILM COATED ORAL DAILY
COMMUNITY
Start: 2022-04-26

## 2022-05-09 RX ORDER — ALBUTEROL SULFATE 90 UG/1
AEROSOL, METERED RESPIRATORY (INHALATION)
COMMUNITY
Start: 2022-02-20

## 2022-05-09 RX ORDER — FLUCONAZOLE 150 MG/1
150 TABLET ORAL
COMMUNITY
Start: 2022-04-26 | End: 2022-05-09 | Stop reason: SDUPTHER

## 2022-05-09 RX ORDER — TRAMADOL HYDROCHLORIDE 50 MG/1
50 TABLET ORAL 2 TIMES DAILY PRN
COMMUNITY
Start: 2022-04-26

## 2022-05-09 RX ORDER — FLUTICASONE PROPIONATE 50 MCG
SPRAY, SUSPENSION (ML) NASAL
COMMUNITY
Start: 2022-03-23

## 2022-05-09 RX ORDER — PHENAZOPYRIDINE HYDROCHLORIDE 200 MG/1
200 TABLET, FILM COATED ORAL 3 TIMES DAILY PRN
Qty: 9 TABLET | Refills: 0 | Status: SHIPPED | OUTPATIENT
Start: 2022-05-09 | End: 2022-06-03 | Stop reason: SDUPTHER

## 2022-05-09 RX ORDER — ROSUVASTATIN CALCIUM 10 MG/1
10 TABLET, COATED ORAL
COMMUNITY
Start: 2022-04-26

## 2022-05-09 NOTE — PROGRESS NOTES
"Chief Complaint  urine incontinence/dysuria/acute cystitis (2 month follow up/urine recheck/eval myrbetrig/bladder scan)    Subjective          Samantha Napoles presents to [facility]  History of Present Illness  Ms. Samantha Montelongo is a pleasant 81-year-old female  patient who returns to clinic today for evaluation.  This is a 3-month follow-up for lower urinary tract symptoms including Urine Frequency with incomplete bladder emptying. SHe also has urinary symptoms of urgency, frequency, hesitancy, and has to strain to get urine out.  SHe does not empty her bladder and dribbles alot she states.       Although SHe reports her symptoms have worsened over the last few months, with dysuria, vaginal itching and irritation, consistent with yeast vaginitis, the patient denies any burning with urination, back/flank pain, she does not have any CVA tenderness.  Her urine dipstick today is completely negative for any infection, it is negative for gross/microscopic hematuria.  Her last urine culture showed group B strep.  Her PVR is 0 mL.     She does not have recurrent UTIs but reports she is bothered by straining to urinate.  Patient has a significant cystocele/rectocele, reports she would like to get it fixed.  Discussed referral to OB/GYN, she is still adamant.    She is a G3, P3, former smoker and quit in 1985.  She has a significant left concave scoliotic lumbar curvature, significant history of brain cancer post surgery x3, pleasantly confused, CAD with cardiac stents.  Now on Plavix.  Rest of her PMHx as listed below.     Objective   Vital Signs:  Ht 165.1 cm (65\")   Wt 73.5 kg (162 lb)   BMI 26.96 kg/m²     BMI is >= 25 and < 30. (Overweight) The following options were offered after discussion: weight loss educational material (shared in after visit summary), exercise counseling/recommendations and nutrition counseling/recommendations      Physical " Exam  Constitutional:       General: She is in acute distress.      Appearance: She is well-developed.   HENT:      Head: Normocephalic and atraumatic.   Eyes:      Pupils: Pupils are equal, round, and reactive to light.   Neck:      Thyroid: No thyromegaly.      Trachea: No tracheal deviation.   Cardiovascular:      Rate and Rhythm: Normal rate and regular rhythm.      Heart sounds: No murmur heard.  Pulmonary:      Effort: Pulmonary effort is normal. No respiratory distress.      Breath sounds: Normal breath sounds. No stridor. No wheezing.   Abdominal:      General: Bowel sounds are normal. There is distension.      Palpations: Abdomen is soft.      Tenderness: There is abdominal tenderness. There is guarding.   Genitourinary:     Labia:         Right: No tenderness.         Left: No tenderness.       Vagina: Normal. No vaginal discharge.      Comments: Reports dysuria, vaginal irritation consistent with yeast.  Patient has cystocele  Musculoskeletal:         General: Tenderness present. No deformity. Normal range of motion.      Cervical back: Normal range of motion.   Skin:     General: Skin is warm and dry.      Capillary Refill: Capillary refill takes less than 2 seconds.      Coloration: Skin is pale.      Findings: Bruising present. No erythema or rash.   Neurological:      Mental Status: She is alert and oriented to person, place, and time.      Cranial Nerves: No cranial nerve deficit.      Sensory: No sensory deficit.      Motor: Weakness present.      Coordination: Coordination normal.   Psychiatric:         Behavior: Behavior normal.         Thought Content: Thought content normal.         Judgment: Judgment normal.        Result Review :     UA    Urinalysis 11/10/21 3/28/22 5/9/22   Ketones, UA Negative Negative Negative   Leukocytes, UA Trace (A) Small (1+) (A) Negative   (A) Abnormal value            Urine Culture    Urine Culture 11/10/21 3/28/22 5/9/22   Urine Culture 25,000 CFU/mL Normal  Urogenital Charis 25,000 CFU/mL Streptococcus agalactiae (Group B) (A) 50,000 CFU/mL Normal Urogenital Charis   (A) Abnormal value       Comments are available for some flowsheets but are not being displayed.                     Assessment and Plan    Diagnoses and all orders for this visit:    1. Mixed stress and urge urinary incontinence (Primary)  -     Mirabegron ER (Myrbetriq) 50 MG tablet sustained-release 24 hour 24 hr tablet; Take 50 mg by mouth Daily.  Dispense: 30 tablet; Refill: 3    2. Frequency of urination  -     POC Urinalysis Dipstick, Automated    3. Recurrent UTI  -     Urine Culture - Urine, Urine, Random Void    4. Incomplete bladder emptying  -     Bladder Scan    5. Yeast vaginitis  -     fluconazole (Diflucan) 100 MG tablet; Take 1 tablet by mouth Daily for 3 days.  Dispense: 3 tablet; Refill: 0    6. Dysuria  -     Mirabegron ER (Myrbetriq) 50 MG tablet sustained-release 24 hour 24 hr tablet; Take 50 mg by mouth Daily.  Dispense: 30 tablet; Refill: 3  -     phenazopyridine (Pyridium) 200 MG tablet; Take 1 tablet by mouth 3 (Three) Times a Day As Needed (dysuria/burning with urination).  Dispense: 9 tablet; Refill: 0                                        ASESSMENT  ACUTE CYSTITIS/yeast vaginitis/URINARY INCONTINENCE/CYSTOCELE  Ms. Samantha Montelongo is a pleasant 81-year-old female  patient who returns to clinic today for evaluation. This is a 3-month follow-up for lower urinary tract symptoms including Urine Frequency with incomplete bladder emptying. SHe also has urinary symptoms of urgency, frequency, hesitancy, and has to strain to get urine out.  SHe does not empty her bladder and dribbles alot she states.   She has a significant cystocele/rectocele she would like to get fixed.  Her urine dipstick today complete negative for any infection, it is negative for gross/microscopic hematuria.  Her PVR is 0.              Recurrent urinary tract infections/OverActiveBladder/Atrophic/yeast  vaginitis: She is in no apparent distress, and reports a remarkable improvement in her overall symptoms, besides the vaginal itching and irritation.   Again,  We discussed the types of organisms that are found in the urinary tract indicating that the vast majority are results of the patient's own gastrointestinal dennise.  We discussed how many of the antibiotics that are utilized can actually exacerbate these infections by creating resistant organisms and there is only a very few antibiotics that are concentrated in the urine and do not affect the rectal reservoir nor cause recurrent yeast vaginitis.       We discussed the risk factors for recurrent infections being intercourse in younger patients and atrophic changes in older patients.  We discussed the symptoms that are found including pain, pressure, burning, frequency, urgency suprapubic pain and painful intercourse.  I discussed upper tract symptoms including fevers, chills, and indicated the workup would be much more aggressive if the patient were to present with recurrent infections in the face of upper tract symptomatology such as fever. I discussed the history of vesicoureteral reflux in young patients and finally chronic renal scarring as a result of such.     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  The mainstay of treatment has been tight cholinergics which basically caused the bladder to have decreased contractility.  We have discussed the side effects of these treatments including dry mouth, double vision, and increasing constipation.  She reports doing well  on oxybutynin for symptomatic relief.                                                    PLAN  Pelvic organ prolapse with Cystocele/Rectocele-referral placed to OB/GYN Dr. Efrain Ramirez     We resent her  urine for culture. I will call her with results if any bacteria growth     Discussed restarting Macrobid 100 mg PO Daily -Suppressive Therapy if positive bacteria.      She has been encouraged to increase her p.o. fluid intake to at least 1 to 2 L daily and avoid bladder irritants such as caffeine products, spicy foods, and citrusy foods.      I recommend concomitant probiotics with treatment with antibiotics to protect the rectal reservoir including over-the-counter yogurt preparations to abelardo oral pills containing the appropriate probiotics. Patient reports the diligent use of Probiotics.     She is to also continue other medications for yeast vaginitis, atrophic vaginitis as prescribed above.    Start Myrbetriq 50 mg nightly, samples given to try x1 month.  Discussed transitioning to Ditropan if effective.    Diflucan 100 mg daily x3 doses-yeast vaginitis     Also continue Flomax 0.4 mg daily for incomplete bladder emptying     Will see her back  for Follow up in clinic and recheck her urinalysis at that time.     Patient is agreeable to plan of care.     Patient reports that she is not currently experiencing any symptoms of urinary incontinence.     Patient's Body mass index is 26.96 kg/m². indicating that she is overweight (BMI 25-29.9). Patient's (Body mass index is 26.96 kg/m².) indicates that they are overweight with health conditions that include hypertension, dyslipidemias and GERD . Weight is improving with lifestyle modifications. BMI is 26.96. We discussed portion control and increasing exercise. .     Smoking Cessation Counseling:  Former smoker.  Quit in 1988  Patient does not currently use any tobacco products.     Follow Up   Return in about 1 month (around 6/9/2022) for Next scheduled follow up, RECURRENT UTI/DYSURIA/DETRUSSOR INSTABILITY/incontinence.  Patient was given instructions and counseling regarding her condition or for health maintenance advice. Please see specific information pulled  into the AVS if appropriate.

## 2022-05-10 LAB — BACTERIA SPEC AEROBE CULT: NORMAL

## 2022-06-03 ENCOUNTER — OFFICE VISIT (OUTPATIENT)
Dept: UROLOGY | Facility: CLINIC | Age: 82
End: 2022-06-03

## 2022-06-03 VITALS — BODY MASS INDEX: 26.99 KG/M2 | HEIGHT: 65 IN | WEIGHT: 162 LBS

## 2022-06-03 DIAGNOSIS — N39.46 MIXED STRESS AND URGE URINARY INCONTINENCE: Primary | ICD-10-CM

## 2022-06-03 DIAGNOSIS — R30.0 DYSURIA: ICD-10-CM

## 2022-06-03 DIAGNOSIS — R35.0 FREQUENCY OF URINATION: ICD-10-CM

## 2022-06-03 LAB
BILIRUB BLD-MCNC: NEGATIVE MG/DL
CLARITY, POC: CLEAR
COLOR UR: YELLOW
EXPIRATION DATE: ABNORMAL
GLUCOSE UR STRIP-MCNC: ABNORMAL MG/DL
KETONES UR QL: NEGATIVE
LEUKOCYTE EST, POC: NEGATIVE
Lab: ABNORMAL
NITRITE UR-MCNC: NEGATIVE MG/ML
PH UR: 7.5 [PH] (ref 5–8)
PROT UR STRIP-MCNC: NEGATIVE MG/DL
RBC # UR STRIP: NEGATIVE /UL
SP GR UR: 1.01 (ref 1–1.03)
UROBILINOGEN UR QL: NORMAL

## 2022-06-03 PROCEDURE — 87086 URINE CULTURE/COLONY COUNT: CPT | Performed by: NURSE PRACTITIONER

## 2022-06-03 PROCEDURE — 51798 US URINE CAPACITY MEASURE: CPT | Performed by: NURSE PRACTITIONER

## 2022-06-03 PROCEDURE — 81003 URINALYSIS AUTO W/O SCOPE: CPT | Performed by: NURSE PRACTITIONER

## 2022-06-03 PROCEDURE — 99214 OFFICE O/P EST MOD 30 MIN: CPT | Performed by: NURSE PRACTITIONER

## 2022-06-03 RX ORDER — PHENAZOPYRIDINE HYDROCHLORIDE 200 MG/1
200 TABLET, FILM COATED ORAL 3 TIMES DAILY PRN
Qty: 9 TABLET | Refills: 0 | Status: SHIPPED | OUTPATIENT
Start: 2022-06-03 | End: 2023-03-31

## 2022-06-03 NOTE — PROGRESS NOTES
Chief Complaint  Urinary Incontinence (1 MONTH FU )    Subjective        Samantha Napoles presents to [facility]  History of Present Illness    Ms. Samantha Montelongo is a pleasant 81-year-old female  patient who returns to clinic today for evaluation.  This is a 1-month follow-up for lower urinary tract symptoms including Urine Frequency with incomplete bladder emptying. SHe also has urinary symptoms of urgency, frequency, hesitancy, and has to strain to get urine out.  SHe does not empty her bladder and dribbles alot she states.  Started on Myrbetriq 50 mg last month, and is here for reevaluation.  She reports a significant improvement in her urinary symptoms as she is able to hold her urine better.  Reports occasional vaginal pain, with urgency incontinence, but reports overall 95% better.     Although SHe reports  vaginal pain and discomfort, patient reports significant improvement in her urinary symptoms over the last months,  today, SHe denies dysuria, or any burning with urination, back/flank pain, she does not have any CVA tenderness.  She does not have recurrent UTIs but reports she is bothered by straining to urinate sometimes.  Reports vaginal itching and irritation consistent with yeast vaginitis that had resolved. Patient has a significant cystocele/rectocele, reports she would like to get it fixed, but has not done it yet.  Explained to patient this accounts for her vaginal pain and discomfort.  However HER urine dipstick today is completely negative for any leukocyte esterase, negative nitrites, IT IS negative FOR gross but show trace microscopic hematuria.     She is a G3, P3, former smoker and quit in 1985.  She has a significant left concave scoliotic lumbar curvature, significant history of brain cancer post surgery x3, pleasantly confused, CAD with cardiac stents.  Now on Plavix.  Rest of her PMHx as listed below.     Objective   Vital Signs:  Ht 165.1  "cm (65\")   Wt 73.5 kg (162 lb)   BMI 26.96 kg/m²     BMI has not been calculated during today's encounter.       Physical Exam  Constitutional:       General: She is in acute distress.      Appearance: She is well-developed. She is obese.   HENT:      Head: Normocephalic and atraumatic.   Eyes:      Pupils: Pupils are equal, round, and reactive to light.   Neck:      Thyroid: No thyromegaly.      Trachea: No tracheal deviation.   Cardiovascular:      Rate and Rhythm: Normal rate and regular rhythm.      Heart sounds: No murmur heard.  Pulmonary:      Effort: Pulmonary effort is normal. No respiratory distress.      Breath sounds: Normal breath sounds. No stridor. No wheezing.   Abdominal:      General: Bowel sounds are normal.      Palpations: Abdomen is soft.      Tenderness: There is abdominal tenderness.   Genitourinary:     Labia:         Right: No tenderness.         Left: No tenderness.       Vagina: Normal. No vaginal discharge.      Comments: Soft, nontender abdomen no palpable masses organomegaly, rigidity guarding or tenderness no evidence of stress incontinence or hypermobility large symptomatic grade 3 rectocele that does protrude beyond the introitus    Musculoskeletal:         General: Tenderness present. No deformity. Normal range of motion.      Cervical back: Normal range of motion.   Skin:     General: Skin is warm and dry.      Capillary Refill: Capillary refill takes less than 2 seconds.      Coloration: Skin is pale.      Findings: Bruising present. No erythema or rash.   Neurological:      Mental Status: She is alert and oriented to person, place, and time.      Cranial Nerves: No cranial nerve deficit.      Sensory: No sensory deficit.      Motor: Weakness present.      Coordination: Coordination normal.   Psychiatric:         Behavior: Behavior normal.         Thought Content: Thought content normal.         Judgment: Judgment normal.        Result Review :    UA    Urinalysis 3/28/22 " 5/9/22 6/3/22   Ketones, UA Negative Negative Negative   Leukocytes, UA Small (1+) (A) Negative Negative   (A) Abnormal value            Urine Culture    Urine Culture 3/28/22 5/9/22 6/3/22   Urine Culture 25,000 CFU/mL Streptococcus agalactiae (Group B) (A) 50,000 CFU/mL Normal Urogenital Charis No growth   (A) Abnormal value       Comments are available for some flowsheets but are not being displayed.                     Assessment and Plan   Diagnoses and all orders for this visit:    1. Mixed stress and urge urinary incontinence (Primary)  -     Mirabegron ER (Myrbetriq) 50 MG tablet sustained-release 24 hour 24 hr tablet; Take 50 mg by mouth Daily.  Dispense: 30 tablet; Refill: 3    2. Frequency of urination  -     POC Urinalysis Dipstick, Automated  -     Urine Culture - Urine, Urine, Clean Catch  -     Mirabegron ER (Myrbetriq) 50 MG tablet sustained-release 24 hour 24 hr tablet; Take 50 mg by mouth Daily.  Dispense: 30 tablet; Refill: 3  -     phenazopyridine (Pyridium) 200 MG tablet; Take 1 tablet by mouth 3 (Three) Times a Day As Needed (dysuria/burning with urination).  Dispense: 9 tablet; Refill: 0    3. Dysuria  -     Mirabegron ER (Myrbetriq) 50 MG tablet sustained-release 24 hour 24 hr tablet; Take 50 mg by mouth Daily.  Dispense: 30 tablet; Refill: 3  -     phenazopyridine (Pyridium) 200 MG tablet; Take 1 tablet by mouth 3 (Three) Times a Day As Needed (dysuria/burning with urination).  Dispense: 9 tablet; Refill: 0    Other orders  -     Bladder Scan                                                      ASESSMENT  ACUTE CYSTITIS WITH HEMATURIA/URINARY INCONTINENCE/CYSTOCELE  Ms. Samantha Montelongo is a pleasant 81-year-old female  patient who returns to clinic today for evaluation. This is a 1-month follow-up for lower urinary tract symptoms including Urine Frequency with incomplete bladder emptying. SHe also has urinary symptoms of urgency, frequency, hesitancy, and has to strain to get urine out.   SHe does not empty her bladder and hira ocampo she states.   She has a significant cystocele/rectocele she would like to get fixed.  However she has not kept her follow-up appointments with OB/GYN.  She reports significant improvement since starting Myrbetriq 50 mg daily, patient would like to continue.  Her urine dipstick today is complete negative for any infection, is negative for gross/microscopic hematuria.  Her PVR is 0 cc                Recurrent urinary tract infections/OverActiveBladder/Atrophic/yeast vaginitis: She is in NO apparent distress, although reports a remarkable improvement in her overall symptoms.   Again,  We discussed the types of organisms that are found in the urinary tract indicating that the vast majority are results of the patient's own gastrointestinal dennise.  We discussed how many of the antibiotics that are utilized can actually exacerbate these infections by creating resistant organisms and there is only a very few antibiotics that are concentrated in the urine and do not affect the rectal reservoir nor cause recurrent yeast vaginitis.       We discussed the risk factors for recurrent infections being intercourse in younger patients and atrophic changes in older patients.  We discussed the symptoms that are found including pain, pressure, burning, frequency, urgency suprapubic pain and painful intercourse.  I discussed upper tract symptoms including fevers, chills, and indicated the workup would be much more aggressive if the patient were to present with recurrent infections in the face of upper tract symptomatology such as fever. I discussed the history of vesicoureteral reflux in young patients and finally chronic renal scarring as a result of such.                                                            PLAN  Pelvic organ prolapse with cystocele/rectocele-referral placed to OB/GYN Dr. Efrain Ramirez     We resent her urine for culture. I will call her with results if any  bacteria growth     Discussed restarting Macrobid 100 mg PO Daily -Suppressive Therapy-DEFERRED.  No recent infections urine culture negative.     She has been encouraged to increase her p.o. fluid intake to at least 1 to 2 L daily and avoid bladder irritants such as caffeine products, spicy foods, and citrusy foods.      I recommend concomitant probiotics with treatment with antibiotics to protect the rectal reservoir including over-the-counter yogurt preparations to abelardo oral pills containing the appropriate probiotics. Patient reports the diligent use of Probiotics.     She is to also continue other medications for yeast vaginitis, atrophic vaginitis as prescribed above.     Also continue Myrbetriq 50 mg x 2 months, then transition to oxybutynin daily per insurance recommendations-coverage     Will see her back  for Follow up in clinic and recheck her urinalysis at that time.     Patient is agreeable to plan of care.     Patient reports that she is not currently experiencing any symptoms of urinary incontinence.     Patient's Body mass index is 26.96 kg/m². indicating that she is overweight (BMI 25-29.9). Patient's (Body mass index is 26.96 kg/m².) indicates that they are overweight with health conditions that include hypertension, dyslipidemias and GERD . Weight is improving with lifestyle modifications. BMI is 26.96. We discussed portion control and increasing exercise. .     Smoking Cessation Counseling:  Former smoker.  Quit in 1988  Patient does not currently use any tobacco products.         Follow Up   Return in about 3 months (around 9/3/2022) for Next scheduled follow up, RECURRENT UTI/DYSURIA/DETRUSSOR INSTABILITY.     Patient was given instructions and counseling regarding her condition or for health maintenance advice. Please see specific information pulled into the AVS if appropriate.

## 2022-06-04 LAB — BACTERIA SPEC AEROBE CULT: NO GROWTH

## 2022-06-27 ENCOUNTER — OFFICE VISIT (OUTPATIENT)
Dept: UROLOGY | Facility: CLINIC | Age: 82
End: 2022-06-27

## 2022-06-27 VITALS — WEIGHT: 162 LBS | BODY MASS INDEX: 26.99 KG/M2 | HEIGHT: 65 IN

## 2022-06-27 DIAGNOSIS — N81.6 CYSTOCELE WITH RECTOCELE: ICD-10-CM

## 2022-06-27 DIAGNOSIS — N39.0 RECURRENT UTI: ICD-10-CM

## 2022-06-27 DIAGNOSIS — N39.46 MIXED STRESS AND URGE URINARY INCONTINENCE: ICD-10-CM

## 2022-06-27 DIAGNOSIS — R35.0 FREQUENCY OF URINATION: Primary | ICD-10-CM

## 2022-06-27 DIAGNOSIS — N81.10 CYSTOCELE WITH RECTOCELE: ICD-10-CM

## 2022-06-27 LAB
BILIRUB BLD-MCNC: NEGATIVE MG/DL
CLARITY, POC: CLEAR
COLOR UR: YELLOW
EXPIRATION DATE: ABNORMAL
GLUCOSE UR STRIP-MCNC: NEGATIVE MG/DL
KETONES UR QL: NEGATIVE
LEUKOCYTE EST, POC: NEGATIVE
Lab: ABNORMAL
NITRITE UR-MCNC: NEGATIVE MG/ML
PH UR: 6.5 [PH] (ref 5–8)
PROT UR STRIP-MCNC: NEGATIVE MG/DL
RBC # UR STRIP: NEGATIVE /UL
SP GR UR: 1.01 (ref 1–1.03)
UROBILINOGEN UR QL: NORMAL

## 2022-06-27 PROCEDURE — 87086 URINE CULTURE/COLONY COUNT: CPT | Performed by: NURSE PRACTITIONER

## 2022-06-27 PROCEDURE — 81003 URINALYSIS AUTO W/O SCOPE: CPT | Performed by: NURSE PRACTITIONER

## 2022-06-27 PROCEDURE — 99214 OFFICE O/P EST MOD 30 MIN: CPT | Performed by: NURSE PRACTITIONER

## 2022-06-27 RX ORDER — MIRABEGRON 25 MG/1
25 TABLET, FILM COATED, EXTENDED RELEASE ORAL DAILY
COMMUNITY
Start: 2022-05-27 | End: 2023-04-01 | Stop reason: ALTCHOICE

## 2022-06-27 NOTE — PROGRESS NOTES
Chief Complaint  Follow-up (3 month) and Urinary Incontinence    Subjective          Samantha Napoles presents to [facility] for overactive bladder/urinary incontinence  History of Present Illness    Ms. Samantha Montelongo is a pleasant 82-year-old female  patient who returns to clinic today for evaluation.  This is a 3-month follow-up for lower urinary tract symptoms including Urine Frequency with incomplete bladder emptying. SHe also has urinary symptoms of urgency, frequency, hesitancy, and has to strain to get urine out.  SHe does not empty her bladder and dribbles alot she states.  Started on Myrbetriq 50 mg last month, and is here for reevaluation.  She reports a significant improvement in her urinary symptoms as she is able to hold her urine better.  Reports occasional vaginal pain, with urgency incontinence, but reports overall 95% better.      Today 06/24 2022, patient reports recently she has had no leaks, and does not wear any pads and she is super excited.  Denies any vaginal pain and discomfort, patient reports significant improvement in her urinary symptoms over the last 3 months. Today, SHe denies dysuria, or any burning with urination, back/flank pain, she does not have any CVA tenderness.  She does not have recurrent UTIs, SHE  denies any vaginal itching  irritation consistent with yeast vaginitis.      Patient has a significant cystocele/rectocele, reports she would like to get it fixed, but has not done it yet.  Explained to patient this accounts for her vaginal pain and discomfort.  However HER urine dipstick today is completely negative for any leukocyte esterase, negative nitrites, IT IS negative FOR gross but show trace microscopic hematuria.     She is a G3, P3, former smoker and quit in 1985.  She has a significant left concave scoliotic lumbar curvature, significant history of brain cancer post surgery x3, pleasantly confused, CAD with cardiac  "stents.  Now on Plavix.  Rest of her PMHx as listed below.     Objective   Vital Signs:   Ht 165.1 cm (65\")   Wt 73.5 kg (162 lb)   BMI 26.96 kg/m²     Physical Exam  Constitutional:       General: She is in acute distress.      Appearance: She is well-developed. She is ill-appearing.   HENT:      Head: Normocephalic and atraumatic.   Eyes:      Pupils: Pupils are equal, round, and reactive to light.   Neck:      Thyroid: No thyromegaly.      Trachea: No tracheal deviation.   Cardiovascular:      Rate and Rhythm: Normal rate and regular rhythm.      Heart sounds: No murmur heard.  Pulmonary:      Effort: Pulmonary effort is normal. No respiratory distress.      Breath sounds: Normal breath sounds. No stridor. No wheezing.   Abdominal:      General: Bowel sounds are normal. There is distension.      Palpations: Abdomen is soft.      Tenderness: There is abdominal tenderness.   Genitourinary:     Labia:         Right: No tenderness.         Left: No tenderness.       Vagina: Normal. No vaginal discharge.      Comments: Soft, nontender abdomen no palpable masses organomegaly, rigidity guarding or tenderness no evidence of stress incontinence or hypermobility large asymptomatic rectocele but does protrude beyond the introitus, patient requesting female gynecologist.  Referrals sent out to [facility]      Musculoskeletal:         General: Tenderness present. No deformity. Normal range of motion.      Cervical back: Normal range of motion.   Skin:     General: Skin is warm and dry.      Coloration: Skin is not pale.      Findings: No erythema or rash.   Neurological:      Mental Status: She is alert and oriented to person, place, and time.      Cranial Nerves: No cranial nerve deficit.      Sensory: No sensory deficit.      Coordination: Coordination normal.   Psychiatric:         Behavior: Behavior normal.         Thought Content: Thought content normal.         Judgment: Judgment normal.        Result Review : "     UA    Urinalysis 5/9/22 6/3/22 6/27/22   Ketones, UA Negative Negative Negative   Leukocytes, UA Negative Negative Negative           Urine Culture    Urine Culture 5/9/22 6/3/22 6/27/22   Urine Culture 50,000 CFU/mL Normal Urogenital Charis No growth No growth                     Assessment and Plan    Problem List Items Addressed This Visit        Genitourinary and Reproductive     Frequency of urination - Primary    Overview     Added automatically from request for surgery [id number]           Relevant Orders    POC Urinalysis Dipstick, Automated (Completed)      Other Visit Diagnoses     Mixed stress and urge urinary incontinence        Relevant Orders    Urine Culture - Urine, Urine, Clean Catch (Completed)    Cystocele with rectocele        Relevant Medications    Myrbetriq 25 MG tablet sustained-release 24 hour 24 hr tablet    Other Relevant Orders    Urine Culture - Urine, Urine, Clean Catch (Completed)    Recurrent UTI        Relevant Orders    Urine Culture - Urine, Urine, Clean Catch (Completed)        1. Mixed stress and urge urinary incontinence (Primary)  -     Mirabegron ER (Myrbetriq) 50 MG tablet sustained-release 24 hour 24 hr tablet; Take 50 mg by mouth Daily.  Dispense: 30 tablet; Refill: 3     2. Frequency of urination  -     POC Urinalysis Dipstick, Automated  -     Urine Culture - Urine, Urine, Clean Catch  -     Mirabegron ER (Myrbetriq) 50 MG tablet sustained-release 24 hour 24 hr tablet; Take 50 mg by mouth Daily.  Dispense: 30 tablet; Refill: 3  -     phenazopyridine (Pyridium) 200 MG tablet; Take 1 tablet by mouth 3 (Three) Times a Day As Needed (dysuria/burning with urination).  Dispense: 9 tablet; Refill: 0     3. Dysuria  -     Mirabegron ER (Myrbetriq) 50 MG tablet sustained-release 24 hour 24 hr tablet; Take 50 mg by mouth Daily.  Dispense: 30 tablet; Refill: 3  -     phenazopyridine (Pyridium) 200 MG tablet; Take 1 tablet by mouth 3 (Three) Times a Day As Needed (dysuria/burning  with urination).  Dispense: 9 tablet; Refill: 0     Other orders  -     Bladder Scan                                                        ASESSMENT        ACUTE CYSTITIS /URINARY INCONTINENCE/CYSTOCELE  Ms. Samantha Montelongo is a pleasant 81-year-old female  patient who returns to clinic today for evaluation. This is a 3-month follow-up for lower urinary tract symptoms including Urine Frequency with incomplete bladder emptying.    For significant improvement in her  urinary symptoms and denies any issues with leaks or incontinence.   She however still has a significant cystocele/rectocele she would like to get fixed.  However she has not kept her follow-up appointments with OB/GYN.  She reports significant improvement since starting Myrbetriq 50 mg daily, patient would like to continue.  Her urine dipstick today is complete negative for any infection, is negative for gross/microscopic hematuria.  Her PVR is 0 cc                Recurrent urinary tract infections/OverActiveBladder/Atrophic/yeast vaginitis: She is in NO apparent distress, although reports a remarkable improvement in her overall symptoms.  He has not had any UTIs recently, and has increase her p.o. fluid intake.   Again,  We discussed the types of organisms that are found in the urinary tract indicating that the vast majority are results of the patient's own gastrointestinal dennise.  We discussed how many of the antibiotics that are utilized can actually exacerbate these infections by creating resistant organisms and there is only a very few antibiotics that are concentrated in the urine and do not affect the rectal reservoir nor cause recurrent yeast vaginitis.       We discussed the risk factors for recurrent infections being intercourse in younger patients and atrophic changes in older patients.  We discussed the symptoms that are found including pain, pressure, burning, frequency, urgency suprapubic pain and painful intercourse.  I discussed upper  tract symptoms including fevers, chills, and indicated the workup would be much more aggressive if the patient were to present with recurrent infections in the face of upper tract symptomatology such as fever. I discussed the history of vesicoureteral reflux in young patients and finally chronic renal scarring as a result of such.                                                      PLAN  Pelvic organ prolapse with cystocele/rectocele-referral placed to OB/GYN Dr. Efrain Ramirez, requesting for me gynecologist     We resent her urine for culture. I will call her with results if any bacteria growth     Discussed restarting Macrobid 100 mg PO Daily -Suppressive Therapy-DEFERRED.  No recent infections urine culture negative.     She has been encouraged to increase her p.o. fluid intake to at least 1 to 2 L daily and avoid bladder irritants such as caffeine products, spicy foods, and citrusy foods.      I recommend concomitant probiotics with treatment with antibiotics to protect the rectal reservoir including over-the-counter yogurt preparations to abelardo oral pills containing the appropriate probiotics. Patient reports the diligent use of Probiotics.     She is to also continue other medications for yeast vaginitis, atrophic vaginitis as prescribed above.     Also continue Myrbetriq 50 mg x 2 months, then transition to oxybutynin daily per insurance recommendations-coverage     Will see her back  for Follow up in clinic and recheck her urinalysis at that time.     Patient is agreeable to plan of care.     Patient reports that she is not currently experiencing any symptoms of urinary incontinence.     Patient's Body mass index is 26.96 kg/m². indicating that she is overweight (BMI 25-29.9). Patient's (Body mass index is 26.96 kg/m².) indicates that they are overweight with health conditions that include hypertension, dyslipidemias and GERD . Weight is improving with lifestyle modifications. BMI is 26.96. We  discussed portion control and increasing exercise. .     Smoking Cessation Counseling:  Former smoker.  Quit in 1988  Patient does not currently use any tobacco products.      Patient reports that she is not currently experiencing any symptoms of urinary incontinence.  BMI is >= 25 and <30. (Overweight) The following options were offered after discussion;: weight loss educational material (shared in after visit summary), exercise counseling/recommendations and nutrition counseling/recommendations    Smoking Cessation Counseling:  Former smoker. QUIT IN 1988  Patient does not currently use any tobacco products.     Follow Up   Return in about 3 months (around 9/27/2022) for Next scheduled follow up, RECURRENT UTI/DYSURIA/DETRUSSOR INSTABILITY.  Patient was given instructions and counseling regarding her condition or for health maintenance advice. Please see specific information pulled into the AVS if appropriate.

## 2022-06-28 ENCOUNTER — TELEPHONE (OUTPATIENT)
Dept: UROLOGY | Facility: CLINIC | Age: 82
End: 2022-06-28

## 2022-06-28 LAB — BACTERIA SPEC AEROBE CULT: NO GROWTH

## 2022-06-28 NOTE — TELEPHONE ENCOUNTER
----- Message from Liseth Gagnon MA sent at 6/28/2022  2:39 PM EDT -----  Regarding: FW: urine culture negative    ----- Message -----  From: Cheng-Akwa, Griselda, APRN  Sent: 6/28/2022   1:06 PM EDT  To: Liseth Gagnon MA  Subject: urine culture negative                           Please let patient know her urine culture was negative. Folow up as discussed    Result Notes    Urine Culture   No growth         Resulting Agency: [facility]      ----- Message -----  From: Liseth Gagnon MA  Sent: 6/27/2022   2:21 PM EDT  To: Griselda Cheng-Akwa, APRN

## 2022-08-22 NOTE — PROGRESS NOTES
[facility] Cardiology    Encounter Date: 2022    Patient ID: Samantha Napoles is a 82 y.o. female.  : 1940     PCP: Amena Martin APRN       Chief Complaint: Chest Pain and Coronary Artery Disease      PROBLEM LIST:  1. Coronary artery disease:  a. Cardiac catheterization followed by stent of right coronary artery.  b. Most recent cardiac catheterization, 2005 by Dr. Reyna, showed widely patent stent of the right coronary artery, 95% stenosis in very distal/apical segment of LAD and a small caliber vessel (not amenable to catheter-based intervention),ejection fraction was 60%.  c. Echocardiogram, 2007, showed normal LV function, moderate LVH, ejection fraction of 65% to 70%, diastolic dysfunction.  d. Cardiolite stress test, 2007, was negative for ischemia and showed ejection fraction of 72%.  e. Recurrent angina.  Abnormal MPS, 2013, with apical ischemia, EF 80%.  f. Cardiac catheterization, 2013, showed nonobstructive disease of the major vessels without hemodynamically significant disease, small caliber distal/apical LAD with a 60% to 70% which was felt to be the etiology of apical ischemia noted on the stress test.  Ejection fraction was 65%.  This lesion was not amenable to intervention, and medical therapy was recommended.  g. Echo 2020, Dr. Reyna: EF 65%, grade 1 diastolic dysfunction, left atrial cavity moderately dilated, aortic sclerosis with mild AAS (MARIA TERESA 1.8 cm², mean gradient 9 mmHg), trace MR, mild TR, RVSP 38 mmHg.  Calcified mitral valve apparatus  h. ED presentation 2022 with chest pain.  2. Hypertension  3. Palpitations  1. 48 hour holter monitor done on 2016, showed sinus rhythm with occasional PACs and PVCs, rare short runs of PAT  2. Holter 20, Dr. Reyna: Sinus rhythm with frequent PVCs, rare ventricular triplet/NSVT occasional short SVT/PAT, no pauses  4. Hypertensive heart  "disease  5. Dyslipidemia.  6. COPD  7. Degenerative arthritis.  8. Gastroesophageal reflux disease.  9. Surgical history:  a. Status post “brain tumor surgery” in 1963; incomplete database.  b. Status post cholecystectomy.      History of Present Illness: Samantha Napoles is a 82 y.o. female who presents to the cardiology office today for cardiac clearance and to reestablish care. She has plans for knee replacement but this isn't scheduled. She endorses chest heaviness that occurs with exertion and at rest. She does not have this every day. It is not exacerbated by activity. She reports PND. She denies exertional chest pain, dyspnea on exertion, palpitations, orthopnea, and edema. She reports her systolic BP is usually in the 140s at home. She states she did not take her medicine this morning. She lives at home alone. She has a lady who comes and helps her with her house work twice per week.     Past Medical History:   Past Medical History:   Diagnosis Date   • Arthritis    • Cancer (HCC)     brain    • CHF (congestive heart failure) (HCC)    • COPD (chronic obstructive pulmonary disease) (HCC)    • Diabetes mellitus (HCC)    • Elevated cholesterol    • GERD (gastroesophageal reflux disease)    • History of transfusion    • Hypertension    • Incontinence in female    • Murmur, cardiac    • Stroke (HCC)        Past Surgical History:   Past Surgical History:   Procedure Laterality Date   • ABDOMINAL SURGERY     • BRAIN SURGERY  1963    \"brain tumor surgery\"; incomplete database   • CHOLECYSTECTOMY     • COLONOSCOPY     • CYSTOLITHALOPAXY PERCUTANEOUS N/A 12/6/2021    Procedure: CYSTOscopy;  Surgeon: Robby Simons MD;  Location: [facility];  Service: Urology;  Laterality: N/A;   • ENDOSCOPY     • EYE SURGERY Bilateral     iol   • LAPAROSCOPIC TUBAL LIGATION     • SKIN BIOPSY      left cheek       Family History:   Family History   Problem Relation Age of Onset   • Stroke Mother    • Heart disease Father  "       Social History:   Social History     Socioeconomic History   • Marital status:    Tobacco Use   • Smoking status: Former Smoker     Packs/day: 0.25     Years: 18.00     Pack years: 4.50     Quit date: 1985     Years since quittin.1   • Smokeless tobacco: Never Used   Vaping Use   • Vaping Use: Never used   Substance and Sexual Activity   • Alcohol use: No   • Drug use: No   • Sexual activity: Defer       Tobacco History:   Social History     Tobacco Use   Smoking Status Former Smoker   • Packs/day: 0.25   • Years: 18.00   • Pack years: 4.50   • Quit date: 1985   • Years since quittin.1   Smokeless Tobacco Never Used       Medications:     Current Outpatient Medications:   •  albuterol sulfate  (90 Base) MCG/ACT inhaler, INHALE 2 PUFFS BY INHALATION ROUTE EVERY 6 HOURS AS NEEDED, Disp: , Rfl:   •  ALPRAZolam (XANAX) 0.5 MG tablet, , Disp: , Rfl:   •  amLODIPine (NORVASC) 2.5 MG tablet, Take 2.5 mg by mouth Daily., Disp: , Rfl:   •  carvedilol (COREG) 3.125 MG tablet, Take 3.125 mg by mouth 2 (Two) Times a Day., Disp: , Rfl:   •  clopidogrel (PLAVIX) 75 MG tablet, Take 75 mg by mouth daily., Disp: , Rfl:   •  Diclofenac Sodium (VOLTAREN) 1 % gel gel, Apply  topically to the appropriate area as directed Every 12 (Twelve) Hours., Disp: , Rfl:   •  donepezil (ARICEPT) 10 MG tablet, Take 10 mg by mouth every night., Disp: , Rfl:   •  enalapril (VASOTEC) 10 MG tablet, Take 10 mg by mouth 2 (two) times a day., Disp: , Rfl:   •  Farxiga 10 MG tablet, Take 1 tablet by mouth Daily., Disp: , Rfl:   •  fluticasone (FLONASE) 50 MCG/ACT nasal spray, INSTILL 1 SPRAY INTO EACH NOSTRIL DAILY, Disp: , Rfl:   •  FLUZONE HIGH-DOSE QUADRIVALENT 0.7 ML suspension prefilled syringe, , Disp: , Rfl:   •  gabapentin (NEURONTIN) 600 MG tablet, Take 600 mg by mouth 2 (two) times a day., Disp: , Rfl:   •  isosorbide mononitrate (IMDUR) 60 MG 24 hr tablet, Take 60 mg by mouth daily., Disp: , Rfl:   •   ketorolac (TORADOL) 10 MG tablet, Take 1 tablet by mouth Every 6 (Six) Hours As Needed for Moderate Pain ., Disp: 16 tablet, Rfl: 0  •  loratadine (CLARITIN) 10 MG tablet, , Disp: , Rfl:   •  LORazepam (ATIVAN) 0.5 MG tablet, Take 0.5 mg by mouth Every 8 (Eight) Hours As Needed for Sedation (takes qhs by PCP)., Disp: , Rfl:   •  meclizine (ANTIVERT) 25 MG tablet, , Disp: , Rfl:   •  metFORMIN (GLUCOPHAGE) 500 MG tablet, Take 500 mg by mouth 2 (two) times a day with meals., Disp: , Rfl:   •  metFORMIN (GLUCOPHAGE) 850 MG tablet, , Disp: , Rfl:   •  Mirabegron ER (Myrbetriq) 50 MG tablet sustained-release 24 hour 24 hr tablet, Take 50 mg by mouth Daily., Disp: 30 tablet, Rfl: 3  •  montelukast (SINGULAIR) 10 MG tablet, Take 10 mg by mouth every night., Disp: , Rfl:   •  Myrbetriq 25 MG tablet sustained-release 24 hour 24 hr tablet, Take 25 mg by mouth Daily., Disp: , Rfl:   •  nitroglycerin (NITROSTAT) 0.4 MG SL tablet, , Disp: , Rfl:   •  ondansetron (Zofran) 4 MG tablet, Take 1 tablet by mouth Daily As Needed for Nausea or Vomiting., Disp: 30 tablet, Rfl: 1  •  pantoprazole (PROTONIX) 40 MG EC tablet, Take 40 mg by mouth daily., Disp: , Rfl:   •  phenazopyridine (Pyridium) 200 MG tablet, Take 1 tablet by mouth 3 (Three) Times a Day As Needed (dysuria/burning with urination)., Disp: 9 tablet, Rfl: 0  •  REPATHA PUSHTRONEX SYSTEM 420 MG/3.5ML solution cartridge, , Disp: , Rfl:   •  rosuvastatin (CRESTOR) 10 MG tablet, Take 10 mg by mouth every night at bedtime., Disp: , Rfl:   •  tamsulosin (FLOMAX) 0.4 MG capsule 24 hr capsule, Take 1 capsule by mouth Daily., Disp: 30 capsule, Rfl: 5  •  traMADol (ULTRAM) 50 MG tablet, Take 50 mg by mouth 2 (Two) Times a Day As Needed. for pain, Disp: , Rfl:   •  VASCEPA 1 g capsule capsule, , Disp: , Rfl:     Allergies:   Allergies   Allergen Reactions   • Penicillins Anaphylaxis   • Iodine Solution [Povidone Iodine] Other (See Comments)     P.O. Iodine - no allergy to IV contrast  "  • Phenergan [Promethazine] Other (See Comments)     chills     The following portions of the patient's history were reviewed and updated as appropriate: allergies, current medications, past family history, past medical history, past social history, past surgical history and problem list.    Review of Systems   12 point review systems negative except those listed in HPI.        Objective:     /70 (BP Location: Right arm, Patient Position: Sitting)   Pulse 59   Ht 165.1 cm (65\")   Wt 70.8 kg (156 lb)   LMP  (LMP Unknown)   SpO2 98%   BMI 25.96 kg/m²      Physical Exam  Constitutional: Patient appears well-developed and well-nourished.   HENT: HEENT exam unremarkable.   Neck: Neck supple. No JVD present. No carotid bruits.   Cardiovascular: Normal rate, regular rhythm and normal heart sounds. No murmur heard.   2+ symmetric pulses.   Pulmonary/Chest: Breath sounds normal. Does not exhibit tenderness.   Abdominal: Abdomen benign.   Musculoskeletal: Does not exhibit edema.   Neurological: Neurological exam unremarkable.   Vitals reviewed.    Data Review:   Lab review 6/28/2022:  CBC: WBC 7.65, Hgb 12.4, HCT 37.4,   BMP: , K3.6, , CO2 24.2, BUN 13, creatinine 0.90      ECG 12 Lead    Date/Time: 8/26/2022 2:42 PM  Performed by: Sherri Castro PA-C  Authorized by: Sherri Castro PA-C   Comparison: compared with previous ECG from 7/13/2020  Similar to previous ECG  Rhythm: sinus bradycardia  Rate: bradycardic  BPM: 55  Conduction: conduction normal  ST Segments: ST segments normal  T Waves: T waves normal  QRS axis: normal  Other: no other findings    Clinical impression: normal ECG               Assessment:      Diagnosis Plan   1. Coronary artery disease involving native coronary artery of native heart with other form of angina pectoris (HCC)   patient with chest pain with mixed features.  Due to history of coronary artery disease and need for new placement, we will obtain a stress " test for ischemic evaluation.   2. Primary hypertension   elevated today.  Patient reports she did not take her blood pressure medicine.  She will monitor at home and call if her blood pressures greater than 140.   3. Dyslipidemia   continue statin therapy     Plan:   Patient with occasional chest pain with mixed features. Due to history of coronary artery disease and need for knee replacement, we will obtain a stress test for ischemic evaluation.   Continue current medications.   FU in 3 MO in [place], sooner as needed.  Thank you for allowing us to participate in the care of your patient.     Scribed for Jeana Reyna MD by Sherri Castro PA-C. 8/26/2022  14:44 EDT     I, Jeana Reyna MD, personally performed the services described in this documentation as scribed by the above named individual in my presence, and it is both accurate and complete.  8/26/2022  16:24 EDT          Part of this note may be an electronic transcription/translation of spoken language to printed text using the Dragon Dictation System.

## 2022-08-23 DIAGNOSIS — M25.561 RIGHT KNEE PAIN, UNSPECIFIED CHRONICITY: Primary | ICD-10-CM

## 2022-08-24 ENCOUNTER — HOSPITAL ENCOUNTER (OUTPATIENT)
Dept: GENERAL RADIOLOGY | Facility: HOSPITAL | Age: 82
Discharge: HOME | End: 2022-08-24
Admitting: PHYSICIAN ASSISTANT

## 2022-08-24 ENCOUNTER — OFFICE VISIT (OUTPATIENT)
Dept: ORTHOPEDIC SURGERY | Facility: CLINIC | Age: 82
End: 2022-08-24

## 2022-08-24 VITALS
WEIGHT: 159.6 LBS | BODY MASS INDEX: 26.59 KG/M2 | DIASTOLIC BLOOD PRESSURE: 75 MMHG | HEART RATE: 59 BPM | SYSTOLIC BLOOD PRESSURE: 172 MMHG | HEIGHT: 65 IN

## 2022-08-24 DIAGNOSIS — M17.11 PRIMARY OSTEOARTHRITIS OF RIGHT KNEE: Primary | ICD-10-CM

## 2022-08-24 DIAGNOSIS — M25.561 RIGHT KNEE PAIN, UNSPECIFIED CHRONICITY: ICD-10-CM

## 2022-08-24 PROCEDURE — 73562 X-RAY EXAM OF KNEE 3: CPT | Performed by: RADIOLOGY

## 2022-08-24 PROCEDURE — 73562 X-RAY EXAM OF KNEE 3: CPT

## 2022-08-24 PROCEDURE — 99213 OFFICE O/P EST LOW 20 MIN: CPT | Performed by: PHYSICIAN ASSISTANT

## 2022-08-24 PROCEDURE — 20610 DRAIN/INJ JOINT/BURSA W/O US: CPT | Performed by: PHYSICIAN ASSISTANT

## 2022-08-24 RX ORDER — LIDOCAINE HYDROCHLORIDE 10 MG/ML
5 INJECTION, SOLUTION EPIDURAL; INFILTRATION; INTRACAUDAL; PERINEURAL
Status: COMPLETED | OUTPATIENT
Start: 2022-08-24 | End: 2022-08-24

## 2022-08-24 RX ORDER — METHYLPREDNISOLONE ACETATE 80 MG/ML
80 INJECTION, SUSPENSION INTRA-ARTICULAR; INTRALESIONAL; INTRAMUSCULAR; SOFT TISSUE
Status: COMPLETED | OUTPATIENT
Start: 2022-08-24 | End: 2022-08-24

## 2022-08-24 RX ADMIN — LIDOCAINE HYDROCHLORIDE 5 ML: 10 INJECTION, SOLUTION EPIDURAL; INFILTRATION; INTRACAUDAL; PERINEURAL at 15:02

## 2022-08-24 RX ADMIN — METHYLPREDNISOLONE ACETATE 80 MG: 80 INJECTION, SUSPENSION INTRA-ARTICULAR; INTRALESIONAL; INTRAMUSCULAR; SOFT TISSUE at 15:02

## 2022-08-24 NOTE — PROGRESS NOTES
[facility] [place] [facility] New Patient Visit          Patient: Samantha Napoles  YOB: 1940  Date of Encounter: 08/24/2022  PCP: Amena Martin APRN      Subjective     Chief Complaint   Patient presents with   • Right Knee - Initial Evaluation, Pain, Edema           History of Present Illness:     Samantha Napoles is a 82 y.o. female presents today as result of right knee pain and swelling status post fall injury several weeks ago.  She suffered multiple falls however most recent has led to increased swelling and painful range of motion and stiffness.  Patient states that she tripped over a rug and lost her balance.  She reports progressive swelling and stiffness and inability for strain on her knee.  Patient denies any paresthesias.  She reports no conservative treatment options other than avoidance of aggravating factors.  No prior injections, therapy, bracing, etc.        Patient Active Problem List   Diagnosis   • Coronary artery disease   • HTN (hypertension)   • Hypertensive heart disease   • Dyslipidemia   • Degenerative arthritis   • GERD (gastroesophageal reflux disease)   • TIA (transient ischemic attack)   • History of CVA (cerebrovascular accident)   • History of brain cancer   • Type 2 diabetes mellitus (HCC)   • Angina pectoris (HCC)   • Questionable Syncope   • Aortic valve sclerosis   • Grade I diastolic dysfunction   • Kidney stone   • Frequency of urination   • Urinary tract infection without hematuria   • Bladder stones     Past Medical History:   Diagnosis Date   • Arthritis    • Cancer (HCC)     brain    • CHF (congestive heart failure) (HCC)    • COPD (chronic obstructive pulmonary disease) (HCC)    • Diabetes mellitus (HCC)    • Elevated cholesterol    • GERD (gastroesophageal reflux disease)    • History of transfusion    • Hypertension    • Incontinence in female    • Murmur, cardiac    • Stroke (HCC)      Past Surgical History:   Procedure Laterality Date   •  "ABDOMINAL SURGERY     • BRAIN SURGERY      \"brain tumor surgery\"; incomplete database   • CHOLECYSTECTOMY     • COLONOSCOPY     • CYSTOLITHALOPAXY PERCUTANEOUS N/A 2021    Procedure: CYSTOscopy;  Surgeon: Robby Simons MD;  Location: [facility];  Service: Urology;  Laterality: N/A;   • ENDOSCOPY     • EYE SURGERY Bilateral     iol   • LAPAROSCOPIC TUBAL LIGATION     • SKIN BIOPSY      left cheek     Social History     Occupational History   • Not on file   Tobacco Use   • Smoking status: Former Smoker     Packs/day: 0.25     Years: 18.00     Pack years: 4.50     Quit date: 1985     Years since quittin.1   • Smokeless tobacco: Never Used   Vaping Use   • Vaping Use: Never used   Substance and Sexual Activity   • Alcohol use: No   • Drug use: No   • Sexual activity: Defer    Samantha Napoles  reports that she quit smoking about 37 years ago. She has a 4.50 pack-year smoking history. She has never used smokeless tobacco.. I have educated her on the risk of diseases from using tobacco products such as cancer, COPD and heart disease.          Social History     Social History Narrative   • Not on file     Family History   Problem Relation Age of Onset   • Stroke Mother    • Heart disease Father      Current Outpatient Medications   Medication Sig Dispense Refill   • albuterol sulfate  (90 Base) MCG/ACT inhaler INHALE 2 PUFFS BY INHALATION ROUTE EVERY 6 HOURS AS NEEDED     • ALPRAZolam (XANAX) 0.5 MG tablet      • amLODIPine (NORVASC) 2.5 MG tablet Take 2.5 mg by mouth Daily.     • carvedilol (COREG) 3.125 MG tablet Take 3.125 mg by mouth 2 (Two) Times a Day.     • clopidogrel (PLAVIX) 75 MG tablet Take 75 mg by mouth daily.     • Diclofenac Sodium (VOLTAREN) 1 % gel gel Apply  topically to the appropriate area as directed Every 12 (Twelve) Hours.     • donepezil (ARICEPT) 10 MG tablet Take 10 mg by mouth every night.     • enalapril (VASOTEC) 10 MG tablet Take 10 mg by mouth 2 (two) " times a day.     • Farxiga 10 MG tablet Take 1 tablet by mouth Daily.     • fluticasone (FLONASE) 50 MCG/ACT nasal spray INSTILL 1 SPRAY INTO EACH NOSTRIL DAILY     • FLUZONE HIGH-DOSE QUADRIVALENT 0.7 ML suspension prefilled syringe      • gabapentin (NEURONTIN) 600 MG tablet Take 600 mg by mouth 2 (two) times a day.     • isosorbide mononitrate (IMDUR) 60 MG 24 hr tablet Take 60 mg by mouth daily.     • ketorolac (TORADOL) 10 MG tablet Take 1 tablet by mouth Every 6 (Six) Hours As Needed for Moderate Pain . 16 tablet 0   • lisinopril (PRINIVIL,ZESTRIL) 30 MG tablet Take  by mouth.     • loratadine (CLARITIN) 10 MG tablet      • LORazepam (ATIVAN) 0.5 MG tablet Take 0.5 mg by mouth Every 8 (Eight) Hours As Needed for Sedation (takes qhs by PCP).     • meclizine (ANTIVERT) 25 MG tablet      • metFORMIN (GLUCOPHAGE) 500 MG tablet Take 500 mg by mouth 2 (two) times a day with meals.     • metFORMIN (GLUCOPHAGE) 850 MG tablet      • Mirabegron ER (Myrbetriq) 50 MG tablet sustained-release 24 hour 24 hr tablet Take 50 mg by mouth Daily. 30 tablet 3   • montelukast (SINGULAIR) 10 MG tablet Take 10 mg by mouth every night.     • Myrbetriq 25 MG tablet sustained-release 24 hour 24 hr tablet Take 25 mg by mouth Daily.     • nitroglycerin (NITROSTAT) 0.4 MG SL tablet      • ondansetron (Zofran) 4 MG tablet Take 1 tablet by mouth Daily As Needed for Nausea or Vomiting. 30 tablet 1   • pantoprazole (PROTONIX) 40 MG EC tablet Take 40 mg by mouth daily.     • phenazopyridine (Pyridium) 200 MG tablet Take 1 tablet by mouth 3 (Three) Times a Day As Needed (dysuria/burning with urination). 9 tablet 0   • REPATHA PUSHTRONEX SYSTEM 420 MG/3.5ML solution cartridge      • rosuvastatin (CRESTOR) 10 MG tablet Take 10 mg by mouth every night at bedtime.     • tamsulosin (FLOMAX) 0.4 MG capsule 24 hr capsule Take 1 capsule by mouth Daily. 30 capsule 5   • traMADol (ULTRAM) 50 MG tablet Take 50 mg by mouth 2 (Two) Times a Day As Needed. for  "pain     • VASCEPA 1 g capsule capsule        No current facility-administered medications for this visit.     Allergies   Allergen Reactions   • Penicillins Anaphylaxis   • Iodine Solution [Povidone Iodine] Other (See Comments)     P.O. Iodine - no allergy to IV contrast   • Phenergan [Promethazine] Other (See Comments)     chills            Review of Systems   Constitutional: Negative.   HENT: Negative.    Eyes: Negative.    Cardiovascular: Negative.    Respiratory: Negative.    Endocrine: Negative.    Hematologic/Lymphatic: Negative.    Skin: Negative.    Musculoskeletal:        Pertinent positives listed in HPI   Gastrointestinal: Negative.    Genitourinary: Negative.    Neurological: Negative.    Psychiatric/Behavioral: Negative.    Allergic/Immunologic: Negative.           Objective      Vitals:    08/24/22 1418   BP: 172/75   Pulse: 59   Weight: 72.4 kg (159 lb 9.6 oz)   Height: 165.1 cm (65\")      BMI is >= 25 and <30. (Overweight) The following options were offered after discussion;: exercise counseling/recommendations and nutrition counseling/recommendations      Physical Exam  Vitals and nursing note reviewed.   Constitutional:       General: She is not in acute distress.     Appearance: She is not ill-appearing.   HENT:      Head: Normocephalic and atraumatic.      Right Ear: External ear normal.      Left Ear: External ear normal.      Nose: Nose normal. No congestion or rhinorrhea.   Eyes:      Extraocular Movements: Extraocular movements intact.      Conjunctiva/sclera: Conjunctivae normal.      Pupils: Pupils are equal, round, and reactive to light.   Cardiovascular:      Rate and Rhythm: Normal rate.      Pulses: Normal pulses.   Pulmonary:      Effort: Pulmonary effort is normal. No respiratory distress.      Breath sounds: No stridor.   Abdominal:      General: There is no distension.   Musculoskeletal:      Right knee: Tenderness present.      Instability Tests: Medial Narda test negative. "   Skin:     General: Skin is warm and dry.      Capillary Refill: Capillary refill takes less than 2 seconds.   Neurological:      General: No focal deficit present.      Mental Status: She is oriented to person, place, and time.   Psychiatric:         Mood and Affect: Mood normal.         Behavior: Behavior normal.         Thought Content: Thought content normal.         Judgment: Judgment normal.     Knee Musculoskeletal Exam    Gait      Antalgic: right    Assistive device: cane    Inspection      Leg length disparity: no discrepancy      Inspection - Right        Erythema: none        Effusion: moderate        Edema: mild        Ecchymosis: none        Alignment: varus      Palpation      Palpation - Right        Increased warmth: medial      Masses: none        Crepitus: patellofemoral        Tenderness: present          Medial joint line: mild      Range of Motion      Range of Motion - Right        Active extension: -10      Passive extension: -10      Active flexion: 90      Passive flexion: 90    Strength      Strength - Right        Extension: 4/5      Extension: affected by pain      Flexion: 4/5      Flexion: affected by pain    Instability      Instability Signs - Right        Valgus stress grade: 1+      Pivot shift: normal      Anterior drawer: normal      Posterior drawer: normal      Medial Narda test: negative      Lachman: negative    Neurovascular      Neurovascular - Right        Right knee neurovascular exam is normal.      Special Signs      Special Signs - Right Knee        Straight leg raise: normal      J sign: none        Patellar compression: none        Patellar apprehension: mild                Radiology:      XR Knee 3 View Right    Result Date: 8/24/2022  1.  Moderate to advanced tricompartmental osteoarthritis most pronounced of the medial knee compartment. 2.  Small joint effusion.  This report was finalized on 8/24/2022 2:18 PM by Dr. Bari Wang MD.               Assessment/Plan        ICD-10-CM ICD-9-CM   1. Primary osteoarthritis of right knee  M17.11 715.16       82-year-old female with a recent fall with exacerbation of advanced tricompartmental knee osteoarthritis.  Patient has notable effusion as result of this was aspirated yielding 15 cc of blood-tinged serous fluid and injected intra-articularly with 80 mg Depo-Medrol and lidocaine block.  Patient tolerated this procedure well.  She instructed to return back in 3 weeks for further evaluation of efficacy of conservative treatment.      Large Joint Arthrocentesis: R knee  Date/Time: 8/24/2022 3:02 PM  Consent given by: patient  Site marked: site marked  Supporting Documentation  Indications: pain and diagnostic evaluation   Procedure Details  Location: knee - R knee  Needle size: 18 G  Approach: superior  Medications administered: 80 mg methylPREDNISolone acetate 80 MG/ML; 5 mL lidocaine PF 1% 1 %  Aspirate amount: 15 mL  Aspirate: blood-tinged and serous  Patient tolerance: patient tolerated the procedure well with no immediate complications                      This document was signed by Elio Barreto PA-C August 24, 2022     CC: Amena Martin APRN      Dictated Utilizing Dragon Dictation: Part of this note may be an electronic transcription/translation of spoken language to printed text using the Dragon Dictation System.

## 2022-08-25 ENCOUNTER — TELEPHONE (OUTPATIENT)
Dept: CARDIOLOGY | Facility: CLINIC | Age: 82
End: 2022-08-25

## 2022-08-25 NOTE — TELEPHONE ENCOUNTER
"Called patient's number listed. An elderly sounding lady answered the phone. Asked her if this was \"Samantha\", her response was \"Bitch, NO!\" and hung up the phone. Call Nik on number provided, it was not a working number. Called [facility] and the call went straight to voicemail and voicemail box was not set up.    "

## 2022-08-26 ENCOUNTER — OFFICE VISIT (OUTPATIENT)
Dept: CARDIOLOGY | Facility: CLINIC | Age: 82
End: 2022-08-26

## 2022-08-26 VITALS
HEIGHT: 65 IN | SYSTOLIC BLOOD PRESSURE: 160 MMHG | WEIGHT: 156 LBS | OXYGEN SATURATION: 98 % | DIASTOLIC BLOOD PRESSURE: 70 MMHG | BODY MASS INDEX: 25.99 KG/M2 | HEART RATE: 59 BPM

## 2022-08-26 DIAGNOSIS — E78.5 DYSLIPIDEMIA: ICD-10-CM

## 2022-08-26 DIAGNOSIS — I25.118 CORONARY ARTERY DISEASE INVOLVING NATIVE CORONARY ARTERY OF NATIVE HEART WITH OTHER FORM OF ANGINA PECTORIS: Primary | ICD-10-CM

## 2022-08-26 DIAGNOSIS — I10 PRIMARY HYPERTENSION: ICD-10-CM

## 2022-08-26 PROCEDURE — 93000 ELECTROCARDIOGRAM COMPLETE: CPT | Performed by: INTERNAL MEDICINE

## 2022-08-26 PROCEDURE — 99214 OFFICE O/P EST MOD 30 MIN: CPT | Performed by: INTERNAL MEDICINE

## 2023-03-31 ENCOUNTER — OFFICE VISIT (OUTPATIENT)
Dept: CARDIOLOGY | Facility: CLINIC | Age: 83
End: 2023-03-31
Payer: MEDICARE

## 2023-03-31 VITALS
RESPIRATION RATE: 18 BRPM | HEIGHT: 65 IN | WEIGHT: 155 LBS | HEART RATE: 71 BPM | BODY MASS INDEX: 25.83 KG/M2 | DIASTOLIC BLOOD PRESSURE: 66 MMHG | OXYGEN SATURATION: 97 % | SYSTOLIC BLOOD PRESSURE: 122 MMHG

## 2023-03-31 DIAGNOSIS — I11.9 HYPERTENSIVE HEART DISEASE, UNSPECIFIED WHETHER HEART FAILURE PRESENT: ICD-10-CM

## 2023-03-31 DIAGNOSIS — I10 PRIMARY HYPERTENSION: ICD-10-CM

## 2023-03-31 DIAGNOSIS — E78.5 DYSLIPIDEMIA: ICD-10-CM

## 2023-03-31 DIAGNOSIS — I25.118 CORONARY ARTERY DISEASE INVOLVING NATIVE CORONARY ARTERY OF NATIVE HEART WITH OTHER FORM OF ANGINA PECTORIS: Primary | ICD-10-CM

## 2023-03-31 PROCEDURE — 3074F SYST BP LT 130 MM HG: CPT | Performed by: INTERNAL MEDICINE

## 2023-03-31 PROCEDURE — 1159F MED LIST DOCD IN RCRD: CPT | Performed by: INTERNAL MEDICINE

## 2023-03-31 PROCEDURE — 3078F DIAST BP <80 MM HG: CPT | Performed by: INTERNAL MEDICINE

## 2023-03-31 PROCEDURE — 99214 OFFICE O/P EST MOD 30 MIN: CPT | Performed by: INTERNAL MEDICINE

## 2023-03-31 PROCEDURE — 1160F RVW MEDS BY RX/DR IN RCRD: CPT | Performed by: INTERNAL MEDICINE

## 2023-03-31 NOTE — PROGRESS NOTES
[facility] Cardiology    Encounter Date: 2023    Patient ID: Samantha Napoles is a 82 y.o. female.  : 1940     PCP: Amena Martin APRN       Chief Complaint: Coronary Artery Disease      PROBLEM LIST:  1. Coronary artery disease:  a. Cardiac catheterization followed by stent of right coronary artery.  b. Most recent cardiac catheterization, 2005 by Dr. Reyna, showed widely patent stent of the right coronary artery, 95% stenosis in very distal/apical segment of LAD and a small caliber vessel (not amenable to catheter-based intervention),ejection fraction was 60%.  c. Echocardiogram, 2007, showed normal LV function, moderate LVH, ejection fraction of 65% to 70%, diastolic dysfunction.  d. Cardiolite stress test, 2007, was negative for ischemia and showed ejection fraction of 72%.  e. Recurrent angina.  Abnormal MPS, 2013, with apical ischemia, EF 80%.  f. Cardiac catheterization, 2013, showed nonobstructive disease of the major vessels without hemodynamically significant disease, small caliber distal/apical LAD with a 60% to 70% which was felt to be the etiology of apical ischemia noted on the stress test.  Ejection fraction was 65%.  This lesion was not amenable to intervention, and medical therapy was recommended.  g. Echo 2020, Dr. Reyna: EF 65%, grade 1 diastolic dysfunction, left atrial cavity moderately dilated, aortic sclerosis with mild AAS (MARIA TERESA 1.8 cm², mean gradient 9 mmHg), trace MR, mild TR, RVSP 38 mmHg.  Calcified mitral valve apparatus  h. ED presentation 2022 with chest pain.  2. Hypertension  3. Palpitations  1. 48hr Holter, 2016, showed sinus rhythm with occasional PACs and PVCs, rare short runs of PAT  2. Holter 2020, Dr. Reyna: Sinus rhythm with frequent PVCs, rare ventricular triplet/NSVT occasional short SVT/PAT, no pauses  4. Hypertensive heart disease  5. Dyslipidemia.  6. COPD  7. Degenerative  arthritis.  8. Gastroesophageal reflux disease.  9. Surgical history:  a. Status post “brain tumor surgery” in 1963; incomplete database.  b. Status post cholecystectomy.    History of Present Illness  Patient presents today for a follow-up with a history of CAD and cardiac risk factors. Since last visit, patient states that she has done very well from cardiac standpoint.  She reports occasional retrosternal heaviness with moderate to heavy exertion and sometimes randomly.  States that this is probably relieved with nitroglycerin but she has used it only a few times.  She otherwise feels well, remains active and busy without current lifestyle limiting angina.  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     Allergies   Allergen Reactions   • Penicillins Anaphylaxis   • Iodine Solution [Povidone Iodine] Other (See Comments)     P.O. Iodine - no allergy to IV contrast   • Phenergan [Promethazine] Other (See Comments)     chills         Current Outpatient Medications:   •  albuterol sulfate  (90 Base) MCG/ACT inhaler, INHALE 2 PUFFS BY INHALATION ROUTE EVERY 6 HOURS AS NEEDED, Disp: , Rfl:   •  ALPRAZolam (XANAX) 0.5 MG tablet, Take 1 tablet by mouth At Night As Needed., Disp: , Rfl:   •  amLODIPine (NORVASC) 2.5 MG tablet, Take 1 tablet by mouth Daily., Disp: , Rfl:   •  carvedilol (COREG) 3.125 MG tablet, Take 1 tablet by mouth 2 (Two) Times a Day., Disp: , Rfl:   •  clopidogrel (PLAVIX) 75 MG tablet, Take 1 tablet by mouth Daily., Disp: , Rfl:   •  Diclofenac Sodium (VOLTAREN) 1 % gel gel, Apply  topically to the appropriate area as directed Every 12 (Twelve) Hours., Disp: , Rfl:   •  donepezil (ARICEPT) 10 MG tablet, Take 1 tablet by mouth Every Night., Disp: , Rfl:   •  enalapril (VASOTEC) 10 MG tablet, Take 1 tablet by mouth 2 (Two) Times a Day., Disp: , Rfl:   •  Farxiga 10 MG tablet, Take 10 mg by mouth Daily., Disp: , Rfl:   •  fluticasone (FLONASE) 50 MCG/ACT nasal spray,  "INSTILL 1 SPRAY INTO EACH NOSTRIL DAILY, Disp: , Rfl:   •  gabapentin (NEURONTIN) 600 MG tablet, Take 1 tablet by mouth 2 (Two) Times a Day., Disp: , Rfl:   •  isosorbide mononitrate (IMDUR) 60 MG 24 hr tablet, Take 1 tablet by mouth Daily., Disp: , Rfl:   •  ketorolac (TORADOL) 10 MG tablet, Take 1 tablet by mouth Every 6 (Six) Hours As Needed for Moderate Pain ., Disp: 16 tablet, Rfl: 0  •  loratadine (CLARITIN) 10 MG tablet, , Disp: , Rfl:   •  LORazepam (ATIVAN) 0.5 MG tablet, Take 1 tablet by mouth Every 8 (Eight) Hours As Needed for Sedation (takes qhs by PCP)., Disp: , Rfl:   •  meclizine (ANTIVERT) 25 MG tablet, , Disp: , Rfl:   •  metFORMIN (GLUCOPHAGE) 500 MG tablet, Take 1 tablet by mouth 2 (Two) Times a Day With Meals., Disp: , Rfl:   •  Mirabegron ER (Myrbetriq) 50 MG tablet sustained-release 24 hour 24 hr tablet, Take 50 mg by mouth Daily., Disp: 30 tablet, Rfl: 3  •  montelukast (SINGULAIR) 10 MG tablet, Take 1 tablet by mouth Every Night., Disp: , Rfl:   •  nitroglycerin (NITROSTAT) 0.4 MG SL tablet, , Disp: , Rfl:   •  REPATHA PUSHTRONEX SYSTEM 420 MG/3.5ML solution cartridge, , Disp: , Rfl:   •  rosuvastatin (CRESTOR) 10 MG tablet, Take 1 tablet by mouth every night at bedtime., Disp: , Rfl:   •  traMADol (ULTRAM) 50 MG tablet, Take 1 tablet by mouth 2 (Two) Times a Day As Needed. for pain, Disp: , Rfl:   •  VASCEPA 1 g capsule capsule, , Disp: , Rfl:     The following portions of the patient's history were reviewed and updated as appropriate: allergies, current medications, past family history, past medical history, past social history, past surgical history and problem list.    ROS  Review of Systems   14 point ROS negative except for that listed in the HPI.         Objective:     /66   Pulse 71   Resp 18   Ht 165.1 cm (65\")   Wt 70.3 kg (155 lb)   LMP  (LMP Unknown)   SpO2 97%   BMI 25.79 kg/m²      Physical Exam  Constitutional: Patient appears well-developed and well-nourished. "   HENT: HEENT exam unremarkable.   Neck: Neck supple. No JVD present. No carotid bruits.   Cardiovascular: Normal rate, regular rhythm and normal heart sounds. 2/6 systolic murmur.  2+ symmetric pulses.   Pulmonary/Chest: Breath sounds normal. Does not exhibit tenderness.   Abdominal: Abdomen benign.   Musculoskeletal: Does not exhibit edema.   Neurological: Neurological exam unremarkable.   Vitals reviewed.    Data Review:   Lab Results   Component Value Date    CHOL 93 07/13/2020    TRIG 153 (H) 07/13/2020    HDL 50 07/13/2020    LDL 12 07/13/2020               Assessment:      Diagnosis Plan   1. Coronary artery disease involving native coronary artery of native heart with other form of angina pectoris (HCC)   occasional stable angina, continue Plavix for antiplatelet therapy and current antianginal therapy including amlodipine, carvedilol and Imdur.  Echocardiogram and Cardiolite stress test for further assessment.     2. Primary hypertension  Well controlled. Continue on amlodipine, enalapril, and carvedilol for hypertension.    3. Dyslipidemia  No recent labs for review in visit. CMP and FLP to be ordered to assess kidney function, liver function, cholesterol levels, etc these will be done at the same time with her stress test and echocardiogram. Continue on rosuvastatin and Vascepa for dyslipidemia management.      Plan:   Overall stable from cardiac standpoint with occasional stable angina  Stress test to be ordered to further assess current cardiac status.   Echocardiogram ordered to reassess heart and valve anatomy and function.  CMP and FLP to be ordered to assess lipid profile, LFTs and electrolytes.   Continue current medications.   Continue heart healthy diet and regular exercise.  FU in 6 MO in [facility], sooner as needed.  Thank you for allowing us to participate in the care of your patient.     Scribed for Jeana Reyna MD by Ruby Mnceil. 4/1/2023 12:58 EDT    I, Jeana Reyna MD, personally  performed the services described in this documentation as scribed by the above named individual in my presence, and it is both accurate and complete.  4/1/2023  12:18 EDT      Please note that portions of this note may have been completed with a voice recognition program. Efforts were made to edit the dictations, but occasionally words are mistranscribed.

## (undated) DEVICE — FIBR LASR FLEXIVA 1000 HIPOWR 1P/U

## (undated) DEVICE — COR CYSTO: Brand: MEDLINE INDUSTRIES, INC.

## (undated) DEVICE — GLV SURG PREMIERPRO MIC LTX PF SZ8 BRN

## (undated) DEVICE — EVAC BLDR ELLIK 1P/U